# Patient Record
Sex: FEMALE | Race: ASIAN | NOT HISPANIC OR LATINO | Employment: FULL TIME | URBAN - METROPOLITAN AREA
[De-identification: names, ages, dates, MRNs, and addresses within clinical notes are randomized per-mention and may not be internally consistent; named-entity substitution may affect disease eponyms.]

---

## 2017-05-01 ENCOUNTER — TELEPHONE (OUTPATIENT)
Dept: INTERNAL MEDICINE | Facility: CLINIC | Age: 32
End: 2017-05-01

## 2017-05-01 NOTE — TELEPHONE ENCOUNTER
Pt calls, her  noticed some white bubbles at the corner of her mouth when she was taking a nap yesterday. Pt states that when she woke up she had the sensation that something was stuck in the back of her throat and she couldn't swallow. Pt states that the feeling of something stuck did resolve on it's own and has not returned. She is concerned about what might be causing the bubbles. She states her  has noticed this at other times when she sleeps as well - yesterday was the only time she also felt like something was stuck in her throat.     Pt denies difficulty breathing, additional symptoms, recent illness, coughing or taking any new medications. Informed pt that she was likely drooling and saliva can sometimes have a bubbly appearance. This is not uncommon while sleeping, but she should let us know if she is having an excessive amount of drooling or difficulty swallowing. Pt verbalizes understanding.

## 2017-07-03 ENCOUNTER — OFFICE VISIT (OUTPATIENT)
Dept: INTERNAL MEDICINE | Facility: CLINIC | Age: 32
End: 2017-07-03
Payer: COMMERCIAL

## 2017-07-03 VITALS
HEART RATE: 101 BPM | WEIGHT: 161 LBS | SYSTOLIC BLOOD PRESSURE: 102 MMHG | TEMPERATURE: 99.1 F | OXYGEN SATURATION: 100 % | DIASTOLIC BLOOD PRESSURE: 70 MMHG | HEIGHT: 63 IN | BODY MASS INDEX: 28.53 KG/M2

## 2017-07-03 DIAGNOSIS — R21 RASH OF NECK: Primary | ICD-10-CM

## 2017-07-03 DIAGNOSIS — R22.1 LOCALIZED SWELLING, MASS AND LUMP, NECK: ICD-10-CM

## 2017-07-03 LAB
ALBUMIN SERPL-MCNC: 3.5 G/DL (ref 3.4–5)
ALP SERPL-CCNC: 79 U/L (ref 40–150)
ALT SERPL W P-5'-P-CCNC: 18 U/L (ref 0–50)
ANION GAP SERPL CALCULATED.3IONS-SCNC: 7 MMOL/L (ref 3–14)
AST SERPL W P-5'-P-CCNC: 13 U/L (ref 0–45)
BASOPHILS # BLD AUTO: 0 10E9/L (ref 0–0.2)
BASOPHILS NFR BLD AUTO: 0.1 %
BILIRUB SERPL-MCNC: 0.4 MG/DL (ref 0.2–1.3)
BUN SERPL-MCNC: 9 MG/DL (ref 7–30)
CALCIUM SERPL-MCNC: 9.2 MG/DL (ref 8.5–10.1)
CHLORIDE SERPL-SCNC: 106 MMOL/L (ref 94–109)
CO2 SERPL-SCNC: 26 MMOL/L (ref 20–32)
CREAT SERPL-MCNC: 0.55 MG/DL (ref 0.52–1.04)
DIFFERENTIAL METHOD BLD: ABNORMAL
EOSINOPHIL # BLD AUTO: 0.1 10E9/L (ref 0–0.7)
EOSINOPHIL NFR BLD AUTO: 1.6 %
ERYTHROCYTE [DISTWIDTH] IN BLOOD BY AUTOMATED COUNT: 14.7 % (ref 10–15)
GFR SERPL CREATININE-BSD FRML MDRD: NORMAL ML/MIN/1.7M2
GLUCOSE SERPL-MCNC: 96 MG/DL (ref 70–99)
HCT VFR BLD AUTO: 36.7 % (ref 35–47)
HGB BLD-MCNC: 11.6 G/DL (ref 11.7–15.7)
LYMPHOCYTES # BLD AUTO: 1.2 10E9/L (ref 0.8–5.3)
LYMPHOCYTES NFR BLD AUTO: 14.3 %
MCH RBC QN AUTO: 26.6 PG (ref 26.5–33)
MCHC RBC AUTO-ENTMCNC: 31.6 G/DL (ref 31.5–36.5)
MCV RBC AUTO: 84 FL (ref 78–100)
MONOCYTES # BLD AUTO: 0.5 10E9/L (ref 0–1.3)
MONOCYTES NFR BLD AUTO: 6.2 %
NEUTROPHILS # BLD AUTO: 6.5 10E9/L (ref 1.6–8.3)
NEUTROPHILS NFR BLD AUTO: 77.8 %
PLATELET # BLD AUTO: 304 10E9/L (ref 150–450)
POTASSIUM SERPL-SCNC: 3.5 MMOL/L (ref 3.4–5.3)
PROT SERPL-MCNC: 7.8 G/DL (ref 6.8–8.8)
RBC # BLD AUTO: 4.36 10E12/L (ref 3.8–5.2)
SODIUM SERPL-SCNC: 139 MMOL/L (ref 133–144)
TSH SERPL DL<=0.005 MIU/L-ACNC: 1.37 MU/L (ref 0.4–4)
WBC # BLD AUTO: 8.3 10E9/L (ref 4–11)

## 2017-07-03 PROCEDURE — 99213 OFFICE O/P EST LOW 20 MIN: CPT | Performed by: INTERNAL MEDICINE

## 2017-07-03 PROCEDURE — 80050 GENERAL HEALTH PANEL: CPT | Performed by: INTERNAL MEDICINE

## 2017-07-03 PROCEDURE — 36415 COLL VENOUS BLD VENIPUNCTURE: CPT | Performed by: INTERNAL MEDICINE

## 2017-07-03 RX ORDER — VALACYCLOVIR HYDROCHLORIDE 1 G/1
1000 TABLET, FILM COATED ORAL 3 TIMES DAILY
Qty: 21 TABLET | Refills: 0 | Status: ON HOLD | OUTPATIENT
Start: 2017-07-03 | End: 2017-09-26

## 2017-07-03 RX ORDER — BENZOCAINE/MENTHOL 6 MG-10 MG
LOZENGE MUCOUS MEMBRANE
Qty: 30 G | Refills: 0 | Status: SHIPPED | OUTPATIENT
Start: 2017-07-03 | End: 2017-07-07

## 2017-07-03 NOTE — MR AVS SNAPSHOT
After Visit Summary   7/3/2017    Liliam Galvan    MRN: [id number]           Patient Information     Date Of Birth          1985        Visit Information        Provider Department      7/3/2017 8:20 AM Latricia Vasquez MD [facility]        Today's Diagnoses     Rash of neck    -  1    Localized swelling, mass and lump, neck           Follow-ups after your visit        Additional Services     DERMATOLOGY REFERRAL       Your provider has referred you to: [facility]: [facility] [phone]   [email or web address]  [place] [facility] [phone]   [email or web address]  [place] [phone]   [email or web address]    Please be aware that coverage of these services is subject to the terms and limitations of your health insurance plan.  Call member services at your health plan with any benefit or coverage questions.      Please bring the following with you to your appointment:    (1) Any X-Rays, CTs or MRIs which have been performed.  Contact the facility where they were done to arrange for  prior to your scheduled appointment.    (2) List of current medications  (3) This referral request   (4) Any documents/labs given to you for this referral                  Future tests that were ordered for you today     Open Future Orders        Priority Expected Expires Ordered    US Head Neck Soft Tissue Routine  7/3/2018 7/3/2017            Who to contact     If you have questions or need follow up information about today's clinic visit or your schedule please contact [facility] directly at [phone].  Normal or non-critical lab and imaging results will be communicated to you by MyChart, letter or phone within 4 business days after the clinic has received the results. If you do not hear from us within 7 days, please contact the clinic through MyChart or phone. If you have a  "critical or abnormal lab result, we will notify you by phone as soon as possible.  Submit refill requests through [employer] or call your pharmacy and they will forward the refill request to us. Please allow 3 business days for your refill to be completed.          Additional Information About Your Visit        Avtal24hart Information     [employer] gives you secure access to your electronic health record. If you see a primary care provider, you can also send messages to your care team and make appointments. If you have questions, please call your primary care clinic.  If you do not have a primary care provider, please call [phone] and they will assist you.        Care EveryWhere ID     This is your Care EveryWhere ID. This could be used by other organizations to access your [facility] medical records  [id number]        Your Vitals Were     Pulse Temperature Height Pulse Oximetry BMI (Body Mass Index)       101 99.1  F (37.3  C) (Oral) 5' 2.5\" (1.588 m) 100% 28.98 kg/m2        Blood Pressure from Last 3 Encounters:   07/03/17 102/70   12/21/16 108/72   01/25/16 102/64    Weight from Last 3 Encounters:   07/03/17 161 lb (73 kg)   12/21/16 168 lb 3.2 oz (76.3 kg)   01/25/16 158 lb 4.8 oz (71.8 kg)              We Performed the Following     DERMATOLOGY REFERRAL     TSH with free T4 reflex          Today's Medication Changes          These changes are accurate as of: 7/3/17  8:53 AM.  If you have any questions, ask your nurse or doctor.               Start taking these medicines.        Dose/Directions    hydrocortisone 1 % cream   Commonly known as:  CORTAID   Used for:  Rash of neck   Started by:  Latricia Vasquez MD        Apply sparingly to affected area three times daily for 14 days.   Quantity:  30 g   Refills:  0         Stop taking these medicines if you haven't already. Please contact your care team if you have questions.     ciprofloxacin 500 MG tablet   Commonly known as:  CIPRO   Stopped by:  Latricia Vasquez " MD Ray           metoclopramide 5 MG tablet   Commonly known as:  REGLAN   Stopped by:  Latricia Vasquez MD           PREPLUS 27-1 MG Tabs   Stopped by:  Latricia Vasqeuz MD                Where to get your medicines      These medications were sent to [id number] - [facility] [address] AT [place] [address]  [address] [facility] [address]    Hours:  24-hours Phone:  [phone]     hydrocortisone 1 % cream                Primary Care Provider Office Phone # Fax #    Rashard Lo [phone]       [facility] [address] [facility]  [address]  [facility] [address]        Equal Access to Services     PALOMO RIVERO : Hadii steve ku hadasho Soomaali, waaxda luqadaha, qaybta kaalmada adeegyada, waxay idiin hayjoana london. So [facility] [phone].    ATENCIÓN: Si habla español, tiene a arizmendi disposición servicios gratuitos de asistencia lingüística. [facility] [phone].    We comply with applicable federal civil rights laws and [place] laws. We do not discriminate on the basis of race, color, national origin, age, disability sex, sexual orientation or gender identity.            Thank you!     Thank you for choosing [facility]  for your care. Our goal is always to provide you with excellent care. Hearing back from our patients is one way we can continue to improve our services. Please take a few minutes to complete the written survey that you may receive in the mail after your visit with us. Thank you!             Your Updated Medication List - Protect others around you: Learn how to safely use, store and throw away your medicines at www.disposemymeds.org.          This list is accurate as of: 7/3/17  8:53 AM.  Always use your most recent med list.                   Brand Name Dispense Instructions for use Diagnosis    breast pump Misc     1 each    1 each as needed    Ineffective breast feeding        hydrocortisone 1 % cream    CORTAID    30 g    Apply sparingly to affected area three times daily for 14 days.    Rash of neck

## 2017-07-03 NOTE — NURSING NOTE
"Chief Complaint   Patient presents with     Derm Problem     around neck      X 2 months , started by wearing a wool scarf for work . Very itchy painful , tried benadryl cream ,no releif   Initial /70  Pulse 101  Temp 99.1  F (37.3  C) (Oral)  Ht 5' 2.5\" (1.588 m)  Wt 161 lb (73 kg)  SpO2 100%  BMI 28.98 kg/m2 Estimated body mass index is 28.98 kg/(m^2) as calculated from the following:    Height as of this encounter: 5' 2.5\" (1.588 m).    Weight as of this encounter: 161 lb (73 kg).  Medication Reconciliation: complete    "

## 2017-07-03 NOTE — PROGRESS NOTES
"  SUBJECTIVE:                                                    Liliam Galvan is a 32 year old female who presents to clinic today for the following health issues:      Rash.  She had a wool scarf a couple of months ago for work.  She then started to have a rash on her chest and neck.    The patient scratches at night.  The rash does burn slightly.    The neck is the only area of a rash currently.   She feels as if the rest of her body itches.  No one around her with a rash.  No new detergents.   No other concerns today.     Problem list and histories reviewed & adjusted, as indicated.      ROS:  C: NEGATIVE for fever, chills, change in weight  R: NEGATIVE for significant cough or SOB  CV: NEGATIVE for chest pain, palpitations or peripheral edema  Skin: POS rash    OBJECTIVE:     /70  Pulse 101  Temp 99.1  F (37.3  C) (Oral)  Ht 5' 2.5\" (1.588 m)  Wt 161 lb (73 kg)  SpO2 100%  BMI 28.98 kg/m2  Body mass index is 28.98 kg/(m^2).  GENERAL: healthy, alert and no distress  NECK: rash appreciated on neck linear; right side of neck with ?1cm lump appreciated  RESP: lungs clear to auscultation - no rales, rhonchi or wheezes  CV: regular rate and rhythm, normal S1 S2, no S3 or S4, no murmur, click or rub, no peripheral edema and peripheral pulses strong        ASSESSMENT/PLAN:     (R21) Rash of neck  (primary encounter diagnosis)  Comment: possible contact dermatitis versus shingles on dermatome of neck  Plan: hydrocortisone (CORTAID) 1 % cream, DERMATOLOGY        REFERRAL, Comprehensive metabolic panel, CBC         with platelets differential, valACYclovir         (VALTREX) 1000 mg tablet            (R22.1) Localized swelling, mass and lump, neck  Comment:   Plan: TSH with free T4 reflex, US Head Neck Soft         Tissue                Latricia Vasquez MD  [facility]      "

## 2017-07-05 ENCOUNTER — TELEPHONE (OUTPATIENT)
Dept: INTERNAL MEDICINE | Facility: CLINIC | Age: 32
End: 2017-07-05

## 2017-07-05 NOTE — TELEPHONE ENCOUNTER
Pt calls for results of lab done on Monday. Informed pt results were sent through tomoguides and per result note:   Notes Recorded by Latricia Vasquez MD on 7/4/2017 at 9:16 PM  The blood counts are within acceptable limits.  The thyroid test is normal.  The electrolytes and kidney function (GFR) and liver function tests (AST and ALT) are normal.    Pt also asking about rash, she now sees the rash on her legs. Pt asks what she should do, advised pt to follow up with Dermatology - referral was placed by Dr. Vasquez at her appt.

## 2017-07-06 ENCOUNTER — NURSE TRIAGE (OUTPATIENT)
Dept: NURSING | Facility: CLINIC | Age: 32
End: 2017-07-06

## 2017-07-07 ENCOUNTER — OFFICE VISIT (OUTPATIENT)
Dept: PEDIATRICS | Facility: CLINIC | Age: 32
End: 2017-07-07
Payer: COMMERCIAL

## 2017-07-07 VITALS
TEMPERATURE: 98 F | BODY MASS INDEX: 28.53 KG/M2 | SYSTOLIC BLOOD PRESSURE: 110 MMHG | DIASTOLIC BLOOD PRESSURE: 66 MMHG | WEIGHT: 161 LBS | HEART RATE: 68 BPM | OXYGEN SATURATION: 98 % | HEIGHT: 63 IN

## 2017-07-07 DIAGNOSIS — R21 RASH OF NECK: ICD-10-CM

## 2017-07-07 DIAGNOSIS — R21 RASH AND NONSPECIFIC SKIN ERUPTION: Primary | ICD-10-CM

## 2017-07-07 PROCEDURE — 99213 OFFICE O/P EST LOW 20 MIN: CPT | Performed by: INTERNAL MEDICINE

## 2017-07-07 RX ORDER — BENZOCAINE/MENTHOL 6 MG-10 MG
LOZENGE MUCOUS MEMBRANE
Qty: 30 G | Refills: 0 | Status: ON HOLD | OUTPATIENT
Start: 2017-07-07 | End: 2017-09-26

## 2017-07-07 RX ORDER — PERMETHRIN 50 MG/G
CREAM TOPICAL
Qty: 60 G | Refills: 1 | Status: ON HOLD | OUTPATIENT
Start: 2017-07-07 | End: 2017-09-26

## 2017-07-07 NOTE — MR AVS SNAPSHOT
After Visit Summary   7/7/2017    Liliam Galvan    MRN: [id number]           Patient Information     Date Of Birth          1985        Visit Information        Provider Department      7/7/2017 7:30 AM Kendrick [facility]        Today's Diagnoses     Rash and nonspecific skin eruption    -  1    Rash of neck          Care Instructions    1) Start zyrtec, claritin, or allegra to help with itch    2) Treat with permethrin to cover for scabies- this seems less likely but we should still cover for this, wash all bedding in hot water after use. Put on at night and wash off after 8-14 hours    3) Change bathroom soaps to hypoallergenic and change detergent to Free and Clear type (low allergen)    4) Try to wear cotton as this can be lower allergy    If areas are not getting better, we can have a dermatologist evaluate for causes.    Kendrick Sierra MD          Follow-ups after your visit        Who to contact     If you have questions or need follow up information about today's clinic visit or your schedule please contact [facility] directly at [phone].  Normal or non-critical lab and imaging results will be communicated to you by Performablehart, letter or phone within 4 business days after the clinic has received the results. If you do not hear from us within 7 days, please contact the clinic through [employer] or phone. If you have a critical or abnormal lab result, we will notify you by phone as soon as possible.  Submit refill requests through [employer] or call your pharmacy and they will forward the refill request to us. Please allow 3 business days for your refill to be completed.          Additional Information About Your Visit        PerformableharViolet Information     [employer] gives you secure access to your electronic health record. If you see a primary care provider, you can also send messages to your care team and make appointments. If you have questions, please call your  "primary care clinic.  If you do not have a primary care provider, please call [phone] and they will assist you.        Care EveryWhere ID     This is your Care EveryWhere ID. This could be used by other organizations to access your [facility] medical records  [id number]        Your Vitals Were     Pulse Temperature Height Pulse Oximetry BMI (Body Mass Index)       68 98  F (36.7  C) (Oral) 5' 2.5\" (1.588 m) 98% 28.98 kg/m2        Blood Pressure from Last 3 Encounters:   07/07/17 110/66   07/03/17 102/70   12/21/16 108/72    Weight from Last 3 Encounters:   07/07/17 161 lb (73 kg)   07/03/17 161 lb (73 kg)   12/21/16 168 lb 3.2 oz (76.3 kg)              Today, you had the following     No orders found for display         Today's Medication Changes          These changes are accurate as of: 7/7/17  8:03 AM.  If you have any questions, ask your nurse or doctor.               Start taking these medicines.        Dose/Directions    permethrin 5 % cream   Commonly known as:  ELIMITE   Used for:  Rash and nonspecific skin eruption   Started by:  Kendrick Sierra MD        Apply cream from head to toe (except the face); leave on for 8-14 hours then wash off with water;   Quantity:  60 g   Refills:  1            Where to get your medicines      These medications were sent to [facility] [address] [place] [id number]    Hours:  24-hours Phone:  [phone]     hydrocortisone 1 % cream    permethrin 5 % cream                Primary Care Provider Office Phone # Fax #    Rashard Lo [phone]       [facility] [place] [address] [place] [address]        Equal Access to Services     PALOMO RIVERO AH: Carlota Malagon, waalmaz luqadaha, qaybta kaalmanathaniel taylor, annabelle london. So [facility] [phone].    ATENCIÓN: Si shaan fontenot " disposición servicios gratuitos de asistencia lingüística. [address] [phone].    We comply with applicable federal civil rights laws and [place] laws. We do not discriminate on the basis of race, color, national origin, age, disability sex, sexual orientation or gender identity.            Thank you!     Thank you for choosing [facility]  for your care. Our goal is always to provide you with excellent care. Hearing back from our patients is one way we can continue to improve our services. Please take a few minutes to complete the written survey that you may receive in the mail after your visit with us. Thank you!             Your Updated Medication List - Protect others around you: Learn how to safely use, store and throw away your medicines at www.disposemymeds.org.          This list is accurate as of: 7/7/17  8:03 AM.  Always use your most recent med list.                   Brand Name Dispense Instructions for use Diagnosis    breast pump Misc     1 each    1 each as needed    Ineffective breast feeding       hydrocortisone 1 % cream    CORTAID    30 g    Apply sparingly to affected area three times daily for 14 days.    Rash of neck       permethrin 5 % cream    ELIMITE    60 g    Apply cream from head to toe (except the face); leave on for 8-14 hours then wash off with water;    Rash and nonspecific skin eruption       valACYclovir 1000 mg tablet    VALTREX    21 tablet    Take 1 tablet (1,000 mg) by mouth 3 times daily    Rash of neck

## 2017-07-07 NOTE — NURSING NOTE
"Chief Complaint   Patient presents with     Derm Problem       Initial /66 (BP Location: Right arm, Cuff Size: Adult Regular)  Pulse 68  Temp 98  F (36.7  C) (Oral)  Ht 5' 2.5\" (1.588 m)  Wt 161 lb (73 kg)  SpO2 98%  BMI 28.98 kg/m2 Estimated body mass index is 28.98 kg/(m^2) as calculated from the following:    Height as of this encounter: 5' 2.5\" (1.588 m).    Weight as of this encounter: 161 lb (73 kg).  Medication Reconciliation: complete   Angeli Meyers MA    "

## 2017-07-07 NOTE — PATIENT INSTRUCTIONS
1) Start zyrtec, claritin, or allegra to help with itch    2) Treat with permethrin to cover for scabies- this seems less likely but we should still cover for this, wash all bedding in hot water after use. Put on at night and wash off after 8-14 hours    3) Change bathroom soaps to hypoallergenic and change detergent to Free and Clear type (low allergen)    4) Try to wear cotton as this can be lower allergy    If areas are not getting better, we can have a dermatologist evaluate for causes.    Kendrick Sierra MD

## 2017-07-07 NOTE — PROGRESS NOTES
SUBJECTIVE:                                                    Liliam Galvan is a 32 year old female who presents to clinic today for the following health issues:      Follow up  Patient was in Monday July 3rd for Shingles on her neck  Started valtrex the same day, and by the 4th her rash had gotten worse.   Moved down her chest and began in between her legs.   Arms are itching but no rash is present.     Has noted rash around the neck area, started after she Had to wear new scarf and causes issues with rash on the neck, seems getting better but still itching. Had bumps that were on both sides of neck that were almost blister like- was started on valtrex for this as was concerning by previous provider for shingles. The blisters are now gone, now notes a pruritic area on the anterior neck where scarf was. The scarf was made of wool and gave her a more mild reaction every time she used it before.     Using caress and dove soaps, washing cloths with Tide- no changes. No others in home with similar symptoms. No travel.     Now notes areas on her arms with raised small bumps and also in the bilateral medial thigh area with bumps which are pruritic. No discharge from areas.          Problem list and histories reviewed & adjusted, as indicated.  Additional history: as documented    Patient Active Problem List   Diagnosis     CARDIOVASCULAR SCREENING; LDL GOAL LESS THAN 160     PCOS (polycystic ovarian syndrome)     Past Surgical History:   Procedure Laterality Date     NO HISTORY OF SURGERY         Social History   Substance Use Topics     Smoking status: Never Smoker     Smokeless tobacco: Never Used     Alcohol use No     Family History   Problem Relation Age of Onset     Family History Negative Mother      CEREBROVASCULAR DISEASE Paternal Grandfather      DIABETES No family hx of      Coronary Artery Disease No family hx of      Hypertension No family hx of      Hyperlipidemia No family hx of      Breast Cancer No  "family hx of      Cancer - colorectal No family hx of      Ovarian Cancer No family hx of      Prostate Cancer No family hx of      Depression/Anxiety No family hx of      Anesthesia Reaction No family hx of      Thyroid Disease No family hx of      Asthma No family hx of      OSTEOPOROSIS No family hx of      Chemical Addiction No family hx of      Known Genetic Syndrome No family hx of            Reviewed and updated as needed this visit by clinical staff       Reviewed and updated as needed this visit by Provider         ROS:  Constitutional, HEENT, cardiovascular, pulmonary, GI, , musculoskeletal, neuro, skin, endocrine and psych systems are negative, except as otherwise noted.    OBJECTIVE:     /66 (BP Location: Right arm, Cuff Size: Adult Regular)  Pulse 68  Temp 98  F (36.7  C) (Oral)  Ht 5' 2.5\" (1.588 m)  Wt 161 lb (73 kg)  SpO2 98%  BMI 28.98 kg/m2  Body mass index is 28.98 kg/(m^2).  GENERAL: healthy, alert and no distress  RESP: breathing regular and unlabored  MS: no gross musculoskeletal defects noted, no edema  SKIN: hyperpigmented and excoriated area in the lower anterior neck area along skin fold- no erythema, one small papule on right forearm area, no interdigital area involvement, bilateral thighs with small erythematous papules noted as well  NEURO: Normal strength and tone, mentation intact and speech normal  PSYCH: mentation appears normal, affect normal/bright    Diagnostic Test Results:  none     ASSESSMENT/PLAN:     1. Rash of neck  Appears more consistent with a contact dermatitis with previous exposure, shingles would not be bilateral around the neck area, will continue with hydrocortisone short term over area  - hydrocortisone (CORTAID) 1 % cream; Apply sparingly to affected area three times daily for 14 days.  Dispense: 30 g; Refill: 0    2. Rash and nonspecific skin eruption  Unclear trigger, possible contact irritation as well. Will cover for scabies but this is less " likely given interdigital area involvement. Will switch to hypoallergenic soaps and detergents, use antihistamine as well, consider dermatology evaluation if not improving.  - permethrin (ELIMITE) 5 % cream; Apply cream from head to toe (except the face); leave on for 8-14 hours then wash off with water;  Dispense: 60 g; Refill: 1      Patient Instructions   1) Start zyrtec, claritin, or allegra to help with itch    2) Treat with permethrin to cover for scabies- this seems less likely but we should still cover for this, wash all bedding in hot water after use. Put on at night and wash off after 8-14 hours    3) Change bathroom soaps to hypoallergenic and change detergent to Free and Clear type (low allergen)    4) Try to wear cotton as this can be lower allergy    If areas are not getting better, we can have a dermatologist evaluate for causes.    MD Kendrick Rodríguez MD, MD  [facility]

## 2017-07-07 NOTE — TELEPHONE ENCOUNTER
Saw PCP on 7/3 for shingles on neck. Started Valtrex on 7/3. Pt called clinic 7/5 and c/o rash on legs.PCP referred to Derm. Tonight pt c/o red, raised rash, and itching all over legs, arms and chest. It is unclear when this started. Pt first said it started after she began Valtrex then later said itching was present at appt on 7/3 before Valtrex was started. Says she does not understand why she has to go to Derm for rash. Wants to see PCP for rash. Dianne Larkin RN/FNA    Reason for Disposition    Hives or itching    Additional Information    Negative: [1] Life-threatening reaction (anaphylaxis) in the past to the same drug AND [2] < 2 hours since exposure    Negative: Difficulty breathing or wheezing    Negative: [1] Hoarseness or cough AND [2] started soon after 1st dose of drug    Negative: [1] Swollen tongue AND [2] started soon after 1st dose of drug    Negative: [1] Purple or blood-colored rash (spots or dots) AND [2] fever    Negative: Sounds like a life-threatening emergency to the triager    Negative: Rash is only on 1 part of the body (localized)    Negative: Taking new non-prescription (OTC) antihistamine, decongestant, ear drops, eye drops, or other OTC cough/cold medicine    Negative: Taking new prescription antihistamine, allergy medicine, asthma medicine, eye drops, ear drops or nose drops    Negative: Rash started more than 3 days after stopping new prescription medicine    Negative: Swollen tongue    Negative: [1] Widespread hives AND [2] onset < 2 hours of exposure to 1st dose of drug    Negative: Fever    Negative: Patient sounds very sick or weak to the triager    Negative: [1] Purple or blood-colored rash (spots or dots) AND [2] no fever AND [3] sounds well to triager    Negative: [1] Taking new prescription medication AND [2] rash within 4 hours of 1st dose    Negative: Large or small blisters on skin (i.e., fluid filled bubbles or sacs)    Negative: Bloody crusts on lips or sores in mouth     Negative: Face or lip swelling    Protocols used: RASH - WIDESPREAD ON DRUGS-ADULT-AH

## 2017-07-10 ENCOUNTER — TELEPHONE (OUTPATIENT)
Dept: INTERNAL MEDICINE | Facility: CLINIC | Age: 32
End: 2017-07-10

## 2017-07-10 NOTE — TELEPHONE ENCOUNTER
Pt called. Stated she saw Pedro on 7/3 for a rash. At that point rash was just on neck, but as of 7/5 rash spread to legs, arms and chest and was seen again on 7/7. Pt was given a couple more rx's for cream, but they are not helping much. Pt stated this is starting to get serious and want to know what Pedro recommends. Should she see a derm?        Per 7/6 triage note-Saw PCP on 7/3 for shingles on neck. Started Valtrex on 7/3. Pt called clinic 7/5 and c/o rash on legs.PCP referred to Derm. Tonight pt c/o red, raised rash, and itching all over legs, arms and chest. It is unclear when this started. Pt first said it started after she began Valtrex then later said itching was present at appt on 7/3 before Valtrex was started. Says she does not understand why she has to go to Derm for rash. Wants to see PCP for rash. Dianne Larkin RN/FNA

## 2017-07-20 ENCOUNTER — TELEPHONE (OUTPATIENT)
Dept: INTERNAL MEDICINE | Facility: CLINIC | Age: 32
End: 2017-07-20

## 2017-07-20 ENCOUNTER — HOSPITAL ENCOUNTER (OUTPATIENT)
Dept: ULTRASOUND IMAGING | Facility: CLINIC | Age: 32
Discharge: HOME | End: 2017-07-20
Attending: INTERNAL MEDICINE | Admitting: INTERNAL MEDICINE
Payer: COMMERCIAL

## 2017-07-20 DIAGNOSIS — R22.1 LOCALIZED SWELLING, MASS AND LUMP, NECK: ICD-10-CM

## 2017-07-20 DIAGNOSIS — R59.1 LYMPHADENOPATHY: Primary | ICD-10-CM

## 2017-07-20 LAB — RADIOLOGIST FLAGS: ABNORMAL

## 2017-07-20 PROCEDURE — 76536 US EXAM OF HEAD AND NECK: CPT

## 2017-07-20 NOTE — TELEPHONE ENCOUNTER
Pt called. Stated she wants US results. Having a lot of pain in her neck      Per US impression-IMPRESSION: Multiple enlarged and abnormal appearing lymph nodes in  the right and left neck as described. An infectious, inflammatory or  malignant etiology is possible. Follow-up CT neck with contrast  recommended for further assessment.     [Access Center: Multiple bilateral abnormal appearing cervical lymph  nodes]

## 2017-07-20 NOTE — TELEPHONE ENCOUNTER
Spoke with patient. Discussed US results showing lymph nodes, advised her that radiology recommended neck CT scan.   We agreed to have me place the order, someone will be contacting her to schedule.   Advised patient that I would forward this information to Dr Vasquez.

## 2017-09-25 ENCOUNTER — OFFICE VISIT (OUTPATIENT)
Dept: INTERNAL MEDICINE | Facility: CLINIC | Age: 32
End: 2017-09-25
Payer: COMMERCIAL

## 2017-09-25 ENCOUNTER — TELEPHONE (OUTPATIENT)
Dept: INTERNAL MEDICINE | Facility: CLINIC | Age: 32
End: 2017-09-25

## 2017-09-25 ENCOUNTER — NURSE TRIAGE (OUTPATIENT)
Dept: NURSING | Facility: CLINIC | Age: 32
End: 2017-09-25

## 2017-09-25 ENCOUNTER — HOSPITAL ENCOUNTER (OUTPATIENT)
Dept: CT IMAGING | Facility: CLINIC | Age: 32
Discharge: HOME | End: 2017-09-25
Attending: FAMILY MEDICINE | Admitting: FAMILY MEDICINE
Payer: COMMERCIAL

## 2017-09-25 VITALS
HEART RATE: 126 BPM | TEMPERATURE: 99.4 F | BODY MASS INDEX: 27.82 KG/M2 | WEIGHT: 157 LBS | HEIGHT: 63 IN | SYSTOLIC BLOOD PRESSURE: 110 MMHG | DIASTOLIC BLOOD PRESSURE: 70 MMHG | OXYGEN SATURATION: 96 %

## 2017-09-25 DIAGNOSIS — R59.1 LYMPHADENOPATHY: ICD-10-CM

## 2017-09-25 DIAGNOSIS — R22.1 NECK MASS: ICD-10-CM

## 2017-09-25 DIAGNOSIS — R22.1 NECK MASS: Primary | ICD-10-CM

## 2017-09-25 LAB
ALBUMIN SERPL-MCNC: 3.4 G/DL (ref 3.4–5)
ALP SERPL-CCNC: 89 U/L (ref 40–150)
ALT SERPL W P-5'-P-CCNC: 17 U/L (ref 0–50)
ANION GAP SERPL CALCULATED.3IONS-SCNC: 8 MMOL/L (ref 3–14)
ANISOCYTOSIS BLD QL SMEAR: SLIGHT
AST SERPL W P-5'-P-CCNC: 16 U/L (ref 0–45)
BILIRUB SERPL-MCNC: 0.4 MG/DL (ref 0.2–1.3)
BUN SERPL-MCNC: 5 MG/DL (ref 7–30)
CALCIUM SERPL-MCNC: 9 MG/DL (ref 8.5–10.1)
CHLORIDE SERPL-SCNC: 104 MMOL/L (ref 94–109)
CO2 SERPL-SCNC: 27 MMOL/L (ref 20–32)
CREAT SERPL-MCNC: 0.52 MG/DL (ref 0.52–1.04)
DIFFERENTIAL METHOD BLD: ABNORMAL
ERYTHROCYTE [DISTWIDTH] IN BLOOD BY AUTOMATED COUNT: 14.9 % (ref 10–15)
GFR SERPL CREATININE-BSD FRML MDRD: >90 ML/MIN/1.7M2
GLUCOSE SERPL-MCNC: 93 MG/DL (ref 70–99)
HCT VFR BLD AUTO: 35.3 % (ref 35–47)
HGB BLD-MCNC: 11.1 G/DL (ref 11.7–15.7)
LYMPHOCYTES # BLD AUTO: 1 10E9/L (ref 0.8–5.3)
LYMPHOCYTES NFR BLD AUTO: 11 %
MCH RBC QN AUTO: 26.1 PG (ref 26.5–33)
MCHC RBC AUTO-ENTMCNC: 31.4 G/DL (ref 31.5–36.5)
MCV RBC AUTO: 83 FL (ref 78–100)
MONOCYTES # BLD AUTO: 0.7 10E9/L (ref 0–1.3)
MONOCYTES NFR BLD AUTO: 7 %
NEUTROPHILS # BLD AUTO: 7.8 10E9/L (ref 1.6–8.3)
NEUTROPHILS NFR BLD AUTO: 82 %
PLATELET # BLD AUTO: 347 10E9/L (ref 150–450)
PLATELET # BLD EST: NORMAL 10*3/UL
POTASSIUM SERPL-SCNC: 4 MMOL/L (ref 3.4–5.3)
PROT SERPL-MCNC: 7.9 G/DL (ref 6.8–8.8)
RBC # BLD AUTO: 4.26 10E12/L (ref 3.8–5.2)
SODIUM SERPL-SCNC: 139 MMOL/L (ref 133–144)
WBC # BLD AUTO: 9.5 10E9/L (ref 4–11)

## 2017-09-25 PROCEDURE — 70491 CT SOFT TISSUE NECK W/DYE: CPT

## 2017-09-25 PROCEDURE — 80053 COMPREHEN METABOLIC PANEL: CPT | Performed by: FAMILY MEDICINE

## 2017-09-25 PROCEDURE — 36415 COLL VENOUS BLD VENIPUNCTURE: CPT | Performed by: FAMILY MEDICINE

## 2017-09-25 PROCEDURE — 25000128 H RX IP 250 OP 636: Performed by: FAMILY MEDICINE

## 2017-09-25 PROCEDURE — 85025 COMPLETE CBC W/AUTO DIFF WBC: CPT | Performed by: FAMILY MEDICINE

## 2017-09-25 PROCEDURE — 99214 OFFICE O/P EST MOD 30 MIN: CPT | Performed by: FAMILY MEDICINE

## 2017-09-25 PROCEDURE — 86480 TB TEST CELL IMMUN MEASURE: CPT | Performed by: FAMILY MEDICINE

## 2017-09-25 RX ORDER — IOPAMIDOL 755 MG/ML
500 INJECTION, SOLUTION INTRAVASCULAR ONCE
Status: COMPLETED | OUTPATIENT
Start: 2017-09-25 | End: 2017-09-25

## 2017-09-25 RX ADMIN — SODIUM CHLORIDE 65 ML: 9 INJECTION, SOLUTION INTRAVENOUS at 16:47

## 2017-09-25 RX ADMIN — IOPAMIDOL 80 ML: 755 INJECTION, SOLUTION INTRAVENOUS at 16:47

## 2017-09-25 NOTE — TELEPHONE ENCOUNTER
Pt called again regarding the CT Scan of her neck, states she is having discomfort. Please advise.   Brianne Ramos RN

## 2017-09-25 NOTE — TELEPHONE ENCOUNTER
It shows a lot of enlarged lymph nodes of the neck and chest. This could be due to infection, inflammation or other causes. Dr. Weaver is back tomorrow to review and determine next step.

## 2017-09-25 NOTE — TELEPHONE ENCOUNTER
Clinic Action Needed:Yes, please return call  Reason for Call: Liliam is calling to inquire about her lab work and CT scan results from 9/25.  Advised that one lab is still in process and the remainder of lab work and CT scan results need to be reviewed and released by provider. Liliam is quite anxious and would like a call as soon as results available.  Thank you.     Routed to: RI AURE  Sonny Epic Pool    Reba Still, RN  [facility]

## 2017-09-25 NOTE — MR AVS SNAPSHOT
After Visit Summary   9/25/2017    Liliam Galvan    MRN: [id number]           Patient Information     Date Of Birth          1985        Visit Information        Provider Department      9/25/2017 8:20 AM Amrik Weaver MD [facility]        Today's Diagnoses     Neck mass    -  1       Follow-ups after your visit        Future tests that were ordered for you today     Open Future Orders        Priority Expected Expires Ordered    CT Soft Tissue Neck w Contrast Routine  9/25/2018 9/25/2017            Who to contact     If you have questions or need follow up information about today's clinic visit or your schedule please contact [facility] directly at [phone].  Normal or non-critical lab and imaging results will be communicated to you by MyChart, letter or phone within 4 business days after the clinic has received the results. If you do not hear from us within 7 days, please contact the clinic through [employer] or phone. If you have a critical or abnormal lab result, we will notify you by phone as soon as possible.  Submit refill requests through [employer] or call your pharmacy and they will forward the refill request to us. Please allow 3 business days for your refill to be completed.          Additional Information About Your Visit        MyChart Information     [employer] gives you secure access to your electronic health record. If you see a primary care provider, you can also send messages to your care team and make appointments. If you have questions, please call your primary care clinic.  If you do not have a primary care provider, please call [phone] and they will assist you.        Care EveryWhere ID     This is your Care EveryWhere ID. This could be used by other organizations to access your [place] medical records  [id number]        Your Vitals Were     Pulse Temperature Height Last Period Pulse Oximetry BMI (Body Mass Index)    126 99.4  F  "(37.4  C) (Oral) 5' 3\" (1.6 m) 09/18/2017 96% 27.81 kg/m2       Blood Pressure from Last 3 Encounters:   09/25/17 110/70   07/07/17 110/66   07/03/17 102/70    Weight from Last 3 Encounters:   09/25/17 157 lb (71.2 kg)   07/07/17 161 lb (73 kg)   07/03/17 161 lb (73 kg)              We Performed the Following     CBC with platelets and differential     Comprehensive metabolic panel (BMP + Alb, Alk Phos, ALT, AST, Total. Bili, TP)        Primary Care Provider Office Phone # Fax #    Rashard Lo [phone]       [address] [address]        Equal Access to Services     PALOMO RIVERO : Hadii steve case hadasho Somerle, waaxda luqadaha, qaybta kaalmada adeegyada, annabelle valverde . So [facility] [phone].    ATENCIÓN: Si habla español, tiene a arizmendi disposición servicios gratuitos de asistencia lingüística. Rd al [phone].    We comply with applicable federal civil rights laws and [place] laws. We do not discriminate on the basis of race, color, national origin, age, disability sex, sexual orientation or gender identity.            Thank you!     Thank you for choosing [facility]  for your care. Our goal is always to provide you with excellent care. Hearing back from our patients is one way we can continue to improve our services. Please take a few minutes to complete the written survey that you may receive in the mail after your visit with us. Thank you!             Your Updated Medication List - Protect others around you: Learn how to safely use, store and throw away your medicines at www.disposemymeds.org.          This list is accurate as of: 9/25/17  9:03 AM.  Always use your most recent med list.                   Brand Name Dispense Instructions for use Diagnosis    breast pump Misc     1 each    1 each as needed    Ineffective breast feeding       hydrocortisone 1 % cream    CORTAID    30 g    Apply sparingly to affected " area three times daily for 14 days.    Rash of neck       permethrin 5 % cream    ELIMITE    60 g    Apply cream from head to toe (except the face); leave on for 8-14 hours then wash off with water;    Rash and nonspecific skin eruption       valACYclovir 1000 mg tablet    VALTREX    21 tablet    Take 1 tablet (1,000 mg) by mouth 3 times daily    Rash of neck

## 2017-09-25 NOTE — TELEPHONE ENCOUNTER
Pt calls because she is very worried about what her test results might show.  She was advised that her CBC is the only test that has been resulted.  She asks what the values of her white blood cells are.  She was advised that they are normal, but that her CBC is not remarkable, and doesn't tell us much about her condition.

## 2017-09-25 NOTE — TELEPHONE ENCOUNTER
Patient calling again for results of CT scan.  CT scan resulted. Abnormal findings.   Provider please review and advise. Thank you.

## 2017-09-25 NOTE — TELEPHONE ENCOUNTER
Patient calling again asking for results CT scan and TB, still processing, advised would call when resulted for TB and CT when reviewed by provider.

## 2017-09-25 NOTE — NURSING NOTE
"Chief Complaint   Patient presents with     Throat Problem   continues with throat problems , neck feels stiff ,seen 7/3/17 had U/S of neck   Initial /70  Pulse 126  Temp 99.4  F (37.4  C) (Oral)  Ht 5' 3\" (1.6 m)  Wt 157 lb (71.2 kg)  LMP 09/18/2017  SpO2 96%  BMI 27.81 kg/m2 Estimated body mass index is 27.81 kg/(m^2) as calculated from the following:    Height as of this encounter: 5' 3\" (1.6 m).    Weight as of this encounter: 157 lb (71.2 kg).  Medication Reconciliation: complete    "

## 2017-09-25 NOTE — TELEPHONE ENCOUNTER
"Patient calling again asking for results, again advised pt results for CT scan have not been resulted and reviewed at this time. Advised would call [facility] if critical lab result if not critical would call tomorrow with results. Pt states \" I will call back in 1 hour\".  Patient also states she is \"very nervous about the results, I'm still having stiffness in my neck.\"  "

## 2017-09-25 NOTE — NURSING NOTE
Spoke with Radiology scheduling  They stated that Patient is scheduled today for neck CT at 5:00 .    KLAUDIA Tracy LPN

## 2017-09-25 NOTE — PROGRESS NOTES
"CHIEF COMPLAINT    Neck swelling      HISTORY    This patient has noticed neck swelling over at least 2 months. Initially she noticed a rash, felt to be from a scarf. An ultrasound was done showing lymphadenopathy. A CT was ordered but not completed. She returns now with large swelling in R neck.    Additionally she has felt feverish. Throat occasionally feels tight. Appetite may be down slightly. Wt down 4 #.    Patient Active Problem List   Diagnosis     CARDIOVASCULAR SCREENING; LDL GOAL LESS THAN 160     PCOS (polycystic ovarian syndrome)     Current Outpatient Prescriptions   Medication Sig Dispense Refill     hydrocortisone (CORTAID) 1 % cream Apply sparingly to affected area three times daily for 14 days. 30 g 0     permethrin (ELIMITE) 5 % cream Apply cream from head to toe (except the face); leave on for 8-14 hours then wash off with water; 60 g 1     valACYclovir (VALTREX) 1000 mg tablet Take 1 tablet (1,000 mg) by mouth 3 times daily (Patient not taking: Reported on 7/7/2017) 21 tablet 0     Misc. Devices (BREAST PUMP) MISC 1 each as needed 1 each 0       REVIEW OF SYSTEMS    No HA  No cough or SOB  No CP  No abd pain  No edema      SOCIAL HISTORY    , 2 children 3, 1 1/2.  Works as a .        Past Medical History:   Diagnosis Date     NO ACTIVE PROBLEMS          EXAM  /70  Pulse 126  Temp 99.4  F (37.4  C) (Oral)  Ht 5' 3\" (1.6 m)  Wt 157 lb (71.2 kg)  LMP 09/18/2017  SpO2 96%  BMI 27.81 kg/m2    NAD  Pharynx: WNL  Neck: large mass from R supraclavicular region up into anterior cervical chain, size irregular but about 6 X 6 cm. Suspect lymphadenopathy as opposed to thyroid enlargement  Chest: clear  CV: RSR w/o murmur  Skin: no significant rash  Legs: no edema      (R22.1) Neck mass  (primary encounter diagnosis)  Comment:   Plan: Comprehensive metabolic panel (BMP + Alb, Alk         Phos, ALT, AST, Total. Bili, TP), CBC with         platelets and differential, CT Soft " Tissue Neck        w Contrast

## 2017-09-25 NOTE — TELEPHONE ENCOUNTER
Pt calls, asks if labs from appt with Dr. Weaver are back yet. Informed pt labs are still pending. Pt has appt for CT scan today, asks questions about neck u/s from July. Informed pt that u/s showed abnormal lymph nodes on both sides of her neck and recommended further testing to determine cause.

## 2017-09-26 ENCOUNTER — HOSPITAL ENCOUNTER (INPATIENT)
Facility: CLINIC | Age: 32
LOS: 1 days | Discharge: HOME | DRG: 804 | End: 2017-09-27
Attending: INTERNAL MEDICINE | Admitting: INTERNAL MEDICINE
Payer: COMMERCIAL

## 2017-09-26 ENCOUNTER — APPOINTMENT (OUTPATIENT)
Dept: CT IMAGING | Facility: CLINIC | Age: 32
DRG: 804 | End: 2017-09-26
Attending: PHYSICIAN ASSISTANT
Payer: COMMERCIAL

## 2017-09-26 ENCOUNTER — APPOINTMENT (OUTPATIENT)
Dept: ULTRASOUND IMAGING | Facility: CLINIC | Age: 32
DRG: 804 | End: 2017-09-26
Attending: PHYSICIAN ASSISTANT
Payer: COMMERCIAL

## 2017-09-26 ENCOUNTER — OFFICE VISIT (OUTPATIENT)
Dept: INTERNAL MEDICINE | Facility: CLINIC | Age: 32
End: 2017-09-26
Payer: COMMERCIAL

## 2017-09-26 ENCOUNTER — APPOINTMENT (OUTPATIENT)
Dept: GENERAL RADIOLOGY | Facility: CLINIC | Age: 32
DRG: 804 | End: 2017-09-26
Attending: INTERNAL MEDICINE
Payer: COMMERCIAL

## 2017-09-26 VITALS
DIASTOLIC BLOOD PRESSURE: 70 MMHG | TEMPERATURE: 97.9 F | BODY MASS INDEX: 27.82 KG/M2 | OXYGEN SATURATION: 100 % | SYSTOLIC BLOOD PRESSURE: 110 MMHG | HEART RATE: 129 BPM | HEIGHT: 63 IN | WEIGHT: 157 LBS

## 2017-09-26 DIAGNOSIS — R59.1 LYMPHADENOPATHY: Primary | ICD-10-CM

## 2017-09-26 PROBLEM — R59.0 LAD (LYMPHADENOPATHY), MEDIASTINAL: Status: ACTIVE | Noted: 2017-09-26

## 2017-09-26 LAB — HCG SERPL QL: NEGATIVE

## 2017-09-26 PROCEDURE — 88341 IMHCHEM/IMCYTCHM EA ADD ANTB: CPT | Performed by: RADIOLOGY

## 2017-09-26 PROCEDURE — 25000125 ZZHC RX 250: Performed by: RADIOLOGY

## 2017-09-26 PROCEDURE — 99223 1ST HOSP IP/OBS HIGH 75: CPT | Mod: AI | Performed by: PHYSICIAN ASSISTANT

## 2017-09-26 PROCEDURE — 88305 TISSUE EXAM BY PATHOLOGIST: CPT | Mod: 26 | Performed by: RADIOLOGY

## 2017-09-26 PROCEDURE — 86698 HISTOPLASMA ANTIBODY: CPT | Performed by: INTERNAL MEDICINE

## 2017-09-26 PROCEDURE — 88172 CYTP DX EVAL FNA 1ST EA SITE: CPT | Performed by: RADIOLOGY

## 2017-09-26 PROCEDURE — 87389 HIV-1 AG W/HIV-1&-2 AB AG IA: CPT | Performed by: INTERNAL MEDICINE

## 2017-09-26 PROCEDURE — 12000000 ZZH R&B MED SURG/OB

## 2017-09-26 PROCEDURE — 25000128 H RX IP 250 OP 636: Performed by: INTERNAL MEDICINE

## 2017-09-26 PROCEDURE — 74177 CT ABD & PELVIS W/CONTRAST: CPT

## 2017-09-26 PROCEDURE — 88342 IMHCHEM/IMCYTCHM 1ST ANTB: CPT | Performed by: RADIOLOGY

## 2017-09-26 PROCEDURE — 00000159 ZZHCL STATISTIC H-SEND OUTS PREP: Performed by: RADIOLOGY

## 2017-09-26 PROCEDURE — 71260 CT THORAX DX C+: CPT

## 2017-09-26 PROCEDURE — 36415 COLL VENOUS BLD VENIPUNCTURE: CPT | Performed by: INTERNAL MEDICINE

## 2017-09-26 PROCEDURE — 38505 NEEDLE BIOPSY LYMPH NODES: CPT

## 2017-09-26 PROCEDURE — 87385 HISTOPLASMA CAPSUL AG IA: CPT | Performed by: INTERNAL MEDICINE

## 2017-09-26 PROCEDURE — 86612 BLASTOMYCES ANTIBODY: CPT | Performed by: INTERNAL MEDICINE

## 2017-09-26 PROCEDURE — 71020 XR CHEST 2 VW: CPT

## 2017-09-26 PROCEDURE — 88173 CYTOPATH EVAL FNA REPORT: CPT | Performed by: RADIOLOGY

## 2017-09-26 PROCEDURE — 88172 CYTP DX EVAL FNA 1ST EA SITE: CPT | Mod: 26 | Performed by: RADIOLOGY

## 2017-09-26 PROCEDURE — 99214 OFFICE O/P EST MOD 30 MIN: CPT | Performed by: FAMILY MEDICINE

## 2017-09-26 PROCEDURE — 86635 COCCIDIOIDES ANTIBODY: CPT | Performed by: INTERNAL MEDICINE

## 2017-09-26 PROCEDURE — 88341 IMHCHEM/IMCYTCHM EA ADD ANTB: CPT | Mod: 26 | Performed by: RADIOLOGY

## 2017-09-26 PROCEDURE — 07B13ZX EXCISION OF RIGHT NECK LYMPHATIC, PERCUTANEOUS APPROACH, DIAGNOSTIC: ICD-10-PCS | Performed by: RADIOLOGY

## 2017-09-26 PROCEDURE — 25000125 ZZHC RX 250: Performed by: INTERNAL MEDICINE

## 2017-09-26 PROCEDURE — 84703 CHORIONIC GONADOTROPIN ASSAY: CPT | Performed by: INTERNAL MEDICINE

## 2017-09-26 PROCEDURE — 88342 IMHCHEM/IMCYTCHM 1ST ANTB: CPT | Mod: 26 | Performed by: RADIOLOGY

## 2017-09-26 PROCEDURE — 88305 TISSUE EXAM BY PATHOLOGIST: CPT | Performed by: RADIOLOGY

## 2017-09-26 PROCEDURE — 76942 ECHO GUIDE FOR BIOPSY: CPT

## 2017-09-26 PROCEDURE — 88173 CYTOPATH EVAL FNA REPORT: CPT | Mod: 26 | Performed by: RADIOLOGY

## 2017-09-26 PROCEDURE — 86606 ASPERGILLUS ANTIBODY: CPT | Performed by: INTERNAL MEDICINE

## 2017-09-26 RX ORDER — HYDROMORPHONE HYDROCHLORIDE 1 MG/ML
.3-.5 INJECTION, SOLUTION INTRAMUSCULAR; INTRAVENOUS; SUBCUTANEOUS
Status: DISCONTINUED | OUTPATIENT
Start: 2017-09-26 | End: 2017-09-27 | Stop reason: HOSPADM

## 2017-09-26 RX ORDER — ONDANSETRON 4 MG/1
4 TABLET, ORALLY DISINTEGRATING ORAL EVERY 6 HOURS PRN
Status: DISCONTINUED | OUTPATIENT
Start: 2017-09-26 | End: 2017-09-27 | Stop reason: HOSPADM

## 2017-09-26 RX ORDER — IOPAMIDOL 755 MG/ML
77 INJECTION, SOLUTION INTRAVASCULAR ONCE
Status: COMPLETED | OUTPATIENT
Start: 2017-09-26 | End: 2017-09-26

## 2017-09-26 RX ORDER — OXYCODONE HYDROCHLORIDE 5 MG/1
5-10 TABLET ORAL
Status: DISCONTINUED | OUTPATIENT
Start: 2017-09-26 | End: 2017-09-27 | Stop reason: HOSPADM

## 2017-09-26 RX ORDER — CALCIUM CARBONATE 500 MG/1
500-1000 TABLET, CHEWABLE ORAL 4 TIMES DAILY PRN
Status: DISCONTINUED | OUTPATIENT
Start: 2017-09-26 | End: 2017-09-27 | Stop reason: HOSPADM

## 2017-09-26 RX ORDER — AMOXICILLIN 250 MG
1-2 CAPSULE ORAL 2 TIMES DAILY PRN
Status: DISCONTINUED | OUTPATIENT
Start: 2017-09-26 | End: 2017-09-27 | Stop reason: HOSPADM

## 2017-09-26 RX ORDER — LORAZEPAM 0.5 MG/1
.5-1 TABLET ORAL EVERY 4 HOURS PRN
Status: DISCONTINUED | OUTPATIENT
Start: 2017-09-26 | End: 2017-09-27 | Stop reason: HOSPADM

## 2017-09-26 RX ORDER — LIDOCAINE HYDROCHLORIDE 10 MG/ML
5 INJECTION, SOLUTION EPIDURAL; INFILTRATION; INTRACAUDAL; PERINEURAL ONCE
Status: COMPLETED | OUTPATIENT
Start: 2017-09-26 | End: 2017-09-26

## 2017-09-26 RX ORDER — ACETAMINOPHEN 325 MG/1
650 TABLET ORAL EVERY 4 HOURS PRN
Status: DISCONTINUED | OUTPATIENT
Start: 2017-09-26 | End: 2017-09-27 | Stop reason: HOSPADM

## 2017-09-26 RX ORDER — LIDOCAINE 40 MG/G
CREAM TOPICAL
Status: DISCONTINUED | OUTPATIENT
Start: 2017-09-26 | End: 2017-09-27 | Stop reason: HOSPADM

## 2017-09-26 RX ORDER — PROCHLORPERAZINE MALEATE 5 MG
5-10 TABLET ORAL EVERY 6 HOURS PRN
Status: DISCONTINUED | OUTPATIENT
Start: 2017-09-26 | End: 2017-09-27 | Stop reason: HOSPADM

## 2017-09-26 RX ORDER — NALOXONE HYDROCHLORIDE 0.4 MG/ML
.1-.4 INJECTION, SOLUTION INTRAMUSCULAR; INTRAVENOUS; SUBCUTANEOUS
Status: DISCONTINUED | OUTPATIENT
Start: 2017-09-26 | End: 2017-09-27 | Stop reason: HOSPADM

## 2017-09-26 RX ORDER — ONDANSETRON 2 MG/ML
4 INJECTION INTRAMUSCULAR; INTRAVENOUS EVERY 6 HOURS PRN
Status: DISCONTINUED | OUTPATIENT
Start: 2017-09-26 | End: 2017-09-27 | Stop reason: HOSPADM

## 2017-09-26 RX ORDER — PROCHLORPERAZINE 25 MG
25 SUPPOSITORY, RECTAL RECTAL EVERY 12 HOURS PRN
Status: DISCONTINUED | OUTPATIENT
Start: 2017-09-26 | End: 2017-09-27 | Stop reason: HOSPADM

## 2017-09-26 RX ADMIN — SODIUM CHLORIDE 62 ML: 9 INJECTION, SOLUTION INTRAVENOUS at 14:15

## 2017-09-26 RX ADMIN — LIDOCAINE HYDROCHLORIDE 50 MG: 10 INJECTION, SOLUTION EPIDURAL; INFILTRATION; INTRACAUDAL; PERINEURAL at 14:55

## 2017-09-26 RX ADMIN — IOPAMIDOL 77 ML: 755 INJECTION, SOLUTION INTRAVENOUS at 14:14

## 2017-09-26 ASSESSMENT — ACTIVITIES OF DAILY LIVING (ADL)
DRESS: 0-->INDEPENDENT
FALL_HISTORY_WITHIN_LAST_SIX_MONTHS: NO
COGNITION: 0 - NO COGNITION ISSUES REPORTED
RETIRED_COMMUNICATION: 0-->UNDERSTANDS/COMMUNICATES WITHOUT DIFFICULTY
AMBULATION: 0-->INDEPENDENT
TOILETING: 0-->INDEPENDENT
TRANSFERRING: 0-->INDEPENDENT
SWALLOWING: 0-->SWALLOWS FOODS/LIQUIDS WITHOUT DIFFICULTY
BATHING: 0-->INDEPENDENT
RETIRED_EATING: 0-->INDEPENDENT

## 2017-09-26 NOTE — CONSULTS
"[facility]    Infectious Disease Consultation     Date of Admission:  9/26/2017  Date of Consult (When I saw the patient): 09/26/17    Assessment & Plan   Liliam Galvan is a 32 year old female who was admitted on 9/26/2017.     Impression:  32 y.o female admitted with months of on and off swelling mostly in the right cervical lymph node chain.   Also some fevers, voice hoarse ness and cough.   Patient from [facility], unknown PPD. No known contacts with Tuberculosis and last travel to [facility] was in 2012.   Qunatiferon pending.   Neck CT positive for \" Massive lymphadenopathy of the mediastinum and right neck up to the  level of the hyoid bone as well as mild lymphadenopathy in the  supraclavicular left neck\"     Recommendations:   CT chest to rule out active pulmonary disease.   Sputum for AFB if CT chest positive and is able to given any sputum.  ENT consult for possible FNA of the right neck LN.   CT surgery consult given amount of mediastinal LN involvement.   Fungal serologies ordered for completeness, last HIV testing in 2015 will repeat.   Hold off starting any antimicrobials for now.     Jv Thornton MD    Reason for Consult   Reason for consult: I was asked by Zay ARROYO to evaluate this patient for cervical and mediastinal lymphadenopathy..    Primary Care Physician   Rashard Lo    Chief Complaint   Progressive cervical lymph node swelling in a recent immigrant from [facility].     History is obtained from the patient and medical records    History of Present Illness   Liliam Galvan is a 32 year old female who has noticed neck swelling over at least 2 months. Initially she noticed a rash, felt to be from a scarf. An ultrasound was done showing lymphadenopathy. A CT was ordered but not completed. She returns now with large swelling in R neck.  Also fevers and weight loss but was trying to eat healthy. Some minimal cough, voice hoarseness symptoms. Does not know her TB history. "     Past Medical History   I have reviewed this patient's medical history and updated it with pertinent information if needed.   Past Medical History:   Diagnosis Date     NO ACTIVE PROBLEMS        Past Surgical History   I have reviewed this patient's surgical history and updated it with pertinent information if needed.  Past Surgical History:   Procedure Laterality Date     NO HISTORY OF SURGERY         Prior to Admission Medications   Prior to Admission Medications   Prescriptions Last Dose Informant Patient Reported? Taking?   Misc. Devices (BREAST PUMP) MISC   No No   Si each as needed   hydrocortisone (CORTAID) 1 % cream   No No   Sig: Apply sparingly to affected area three times daily for 14 days.   permethrin (ELIMITE) 5 % cream   No No   Sig: Apply cream from head to toe (except the face); leave on for 8-14 hours then wash off with water;   valACYclovir (VALTREX) 1000 mg tablet   No No   Sig: Take 1 tablet (1,000 mg) by mouth 3 times daily      Facility-Administered Medications: None     Allergies   Allergies   Allergen Reactions     Nkda [No Known Drug Allergies]        Immunization History   Immunization History   Administered Date(s) Administered     Influenza (IIV3) 10/28/2013, 2016     Influenza Vaccine IM 3yrs+ 4 Valent IIV4 2015     TDAP Vaccine (Adacel) 2014     TDAP Vaccine (Boostrix) 2015       Social History   I have reviewed this patient's social history and updated it with pertinent information if needed. Liliam Galvan  reports that she has never smoked. She has never used smokeless tobacco. She reports that she does not drink alcohol or use illicit drugs.    Family History   I have reviewed this patient's family history and updated it with pertinent information if needed.   Family History   Problem Relation Age of Onset     Family History Negative Mother      CEREBROVASCULAR DISEASE Paternal Grandfather      DIABETES No family hx of      Coronary Artery Disease No  family hx of      Hypertension No family hx of      Hyperlipidemia No family hx of      Breast Cancer No family hx of      Cancer - colorectal No family hx of      Ovarian Cancer No family hx of      Prostate Cancer No family hx of      Depression/Anxiety No family hx of      Anesthesia Reaction No family hx of      Thyroid Disease No family hx of      Asthma No family hx of      OSTEOPOROSIS No family hx of      Chemical Addiction No family hx of      Known Genetic Syndrome No family hx of        Review of Systems   The 10 point Review of Systems is negative other than noted in the HPI or here.     Physical Exam   Temp: 99.1  F (37.3  C) Temp src: Oral BP: 123/83 Pulse: 115   Resp: 18 SpO2: 100 % O2 Device: None (Room air)    Vital Signs with Ranges  Temp:  [97.9  F (36.6  C)-99.1  F (37.3  C)] 99.1  F (37.3  C)  Pulse:  [115-129] 115  Resp:  [18] 18  BP: (110-123)/(70-83) 123/83  SpO2:  [100 %] 100 %  157 lbs 0 oz    GENERAL APPEARANCE:  alert and no distress  EYES: Eyes grossly normal to inspection, PERRL and conjunctivae and sclerae normal  HENT: right neck swollen   NECK: no adenopathy, no asymmetry, masses, or scars and thyroid normal to palpation  RESP: lungs clear to auscultation - no rales, rhonchi or wheezes  CV: regular rates and rhythm, normal S1 S2, no S3 or S4 and no murmur, click or rub  LYMPHATICS: normal ant/post cervical and supraclavicular nodes  ABDOMEN: soft, nontender, without hepatosplenomegaly or masses and bowel sounds normal  MS: extremities normal- no gross deformities noted  SKIN: no suspicious lesions or rashes      Data   Lab Results   Component Value Date    WBC 9.5 09/25/2017    HGB 11.1 (L) 09/25/2017    HCT 35.3 09/25/2017     09/25/2017     09/25/2017    POTASSIUM 4.0 09/25/2017    CHLORIDE 104 09/25/2017    CO2 27 09/25/2017    BUN 5 (L) 09/25/2017    CR 0.52 09/25/2017    GLC 93 09/25/2017    SED 57 (H) 02/01/2009    AST 16 09/25/2017    ALT 17 09/25/2017    ALKPHOS  89 09/25/2017    BILITOTAL 0.4 09/25/2017     No results for input(s): CULT in the last 168 hours.  Recent Labs   Lab Test  12/21/16   1436  05/19/15   0900  03/11/14   1531  08/27/13   1108   CULT  50,000 to 100,000 colonies/mL mixed urogenital padmini  >100,000 colonies/mL Escherichia coli*  Beta hemolytic Streptococcus group B isolated*  No growth

## 2017-09-26 NOTE — H&P
[facility]    History and Physical  Hospitalist       Date of Admission:  9/26/2017  Date of Service (when I saw the patient): 09/26/17    Assessment & Plan   Liliam Galvan is a 32 year old female with no significant past medical history who presents with massive cervical and mediastinal lymphadenopathy.  She has been admitted to the hospital for further workup.    Massive cervical and mediastinal lymphadenopathy.  This is as seen on her CT of the neck.  She also has intermittent low-grade fevers, voice hoarseness, and mild cough.  No known TB exposure although she is from [facility], with last travel there in 2012.  Concern also for possibly for lymphoma.  CBC is unremarkable other than mild anemia.  Currently afebrile.  - Infectious disease and heme/onc consults ordered  - CT chest to rule out active pulmonary disease.   - Airborne isolation with negative airflow.  - Sputum for AFB if CT chest positive and is able to given any sputum.  QuantiFERON is pending.  - CT abdomen and pelvis to evaluate for any extension of lymphadenopathy/malignancy  - ENT consulted for FNA of the right neck LN.   - CT surgery consult given amount of mediastinal LN involvement.   - Fungal serologies and HIV ordered for completeness   - Hold off starting any antimicrobials for now.     DVT Prophylaxis: Pneumatic Compression Devices  Code Status: Full Code    Disposition: Expected discharge pending and initial workup results and input from consultants.    Zay Nye    Primary Care Physician   Dr. Amrik Weaver    Chief Complaint   Swollen cervical lymph nodes    History is obtained from the patient    History of Present Illness   Liliam Galvan is a 32 year old female with no significant past medical history who presented to clinic with complaints of neck swelling.  Patient has noticed Swelling over the last two months.  Initially she noticed a rash on her neck, which was felt to be from a scarf.  The rash resolved  but the swelling continued.  An ultrasound of her neck was done which showed lymphadenopathy.  A follow-up CT scan of the neck was completed yesterday  which shows massive lymphadenopathy of the mediastinum and right neck up to the level of the hyoid bone as well as mild lymphadenopathy in the supraclavicular left neck.  There is mass effect with leftward deviation of the aerodigestive tract from the larynx and upper thoracic trachea without significant narrowing of the airway.  Patient was seen in clinic to discuss the test results and was [facility].    Patient does note subjective intermittent low-grade fevers although has not measured this at home.  She has a mild cough and some hoarseness in the morning time.  She denies any chills or night sweats.  She had an intentional approximately 3 pound weight loss over the last couple months.  She does report some decreased appetite.  No shortness of breath or chest pain.  She does have some neck discomfort.  No lightheadedness, syncope, abdominal pain, bloating, nausea/vomiting, diarrhea, dysuria, no weakness, numbness or tingling.  No known tuberculosis exposures.  She emigrated here from [facility] approximately nine years ago, with her last return visit being in .    Past Medical History    Patient denies any past medical history    Past Surgical History   Patient denies any previous surgeries    Prior to Admission Medications   Prior to Admission Medications   Prescriptions Last Dose Informant Patient Reported? Taking?   Misc. Devices (BREAST PUMP) MISC   No No   Si each as needed   hydrocortisone (CORTAID) 1 % cream   No No   Sig: Apply sparingly to affected area three times daily for 14 days.   permethrin (ELIMITE) 5 % cream   No No   Sig: Apply cream from head to toe (except the face); leave on for 8-14 hours then wash off with water;   valACYclovir (VALTREX) 1000 mg tablet   No No   Sig: Take 1 tablet (1,000  mg) by mouth 3 times daily      Facility-Administered Medications: None     Allergies   Allergies   Allergen Reactions     Nkda [No Known Drug Allergies]        Social History   Patient emigrated here nine years ago from [facility].  She works as a .  She denies any history of tobacco use.  She does not drink alcohol.    Family History   Patient reports both of her parents are living and in good health.  She denies any family history of lymphoma, or cancers.  No family history of tuberculosis.    Review of Systems   The 10 point Review of Systems is negative other than noted in the HPI.    Physical Exam   Temp: 99.1  F (37.3  C) Temp src: Oral BP: 123/83 Pulse: 115   Resp: 18 SpO2: 100 % O2 Device: None (Room air)    Vital Signs with Ranges  Temp:  [97.9  F (36.6  C)-99.1  F (37.3  C)] 99.1  F (37.3  C)  Pulse:  [115-129] 115  Resp:  [18] 18  BP: (110-123)/(70-83) 123/83  SpO2:  [100 %] 100 %  157 lbs 0 oz    GENERAL: Alert, oriented to person, place, date. Cooperative and lying in bed in no acute distress.   EYES: Pupils equal, round. Extraocular movements intact.  HEENT:  Normocephalic, Mucous membranes moist. No tonsillar exudate or erythema.   NECK: Supple.  Large, firm cervical lymphadenopathy, right greater than left.  Some bilateral supraclavicular lymphadenopathy as well.  CARDIOVASCULAR: Regular rate and rhythm without murmurs, rubs, or gallops.   PULMONARY: Breath sounds clear, without crackles, wheezes, or rhonchi bilaterally. No accessory muscle use.   GASTROINTESTINAL: Soft and non-distended. Normoactive bowel sounds. Nontender in all 4 quadrants.  No masses.  MUSCULOSKELETAL: Strength 5/5 in upper and lower extremeties bilaterally.   SKIN: Warm, dry, no rashes.  No rashes appreciated on limited exam.  EXTREMITIES: Pulses 2+ in upper and lower extremities bilaterally. No lower extremity edema.    NEUROLOGIC: Cranial nerves II-XII intact.  Motor function grossly intact and equal in upper and  lower extremities bilaterally.   PSYCHIATRIC: Normal mood and affect.     Data   Data reviewed today:  I personally reviewed all labs and imaging results.    Recent Labs  Lab 09/25/17  0902   WBC 9.5   HGB 11.1*   MCV 83         POTASSIUM 4.0   CHLORIDE 104   CO2 27   BUN 5*   CR 0.52   ANIONGAP 8   JULIANA 9.0   GLC 93   ALBUMIN 3.4   PROTTOTAL 7.9   BILITOTAL 0.4   ALKPHOS 89   ALT 17   AST 16       Recent Results (from the past 24 hour(s))   CT Soft Tissue Neck w Contrast    Narrative    CT OF THE NECK WITH CONTRAST  9/25/2017 4:58 PM     COMPARISON: None.    HISTORY: Localized swelling, mass and lump, neck    TECHNIQUE:  Axial CT images of the neck were acquired after the  intravenous administration of 80mL Isovue-370 nonionic iodinated  contrast material. Coronal reconstructions were created.    FINDINGS: There is massive lymphadenopathy throughout the mediastinum.  There is mild lymphadenopathy in the supraclavicular left neck. There  is massive conglomerate lymphadenopathy in the supraclavicular right  neck with marked lymphadenopathy in the right neck extending up to the  level of the hyoid bone. There is no suprahyoid lymphadenopathy in  either side of the neck. There is extensive fat stranding intermingled  among the lymph nodes in the right neck suggesting an inflammatory  process. A few of the lymph nodes in the right neck demonstrate  low-density suggesting necrosis. While lymphatic malignancy must  certainly be considered, an infectious etiology suggest tuberculosis  must also be considered. Clinical correlation recommended.    There are no fluid collections or abscesses anyone the neck. The  salivary glands are unremarkable.    Although the thyroid gland itself is normal in contour and density,  there is marked leftward shift of the aerodigestive tract from the  level of the larynx down to the upper thoracic trachea. Despite this  midline shift, the airway from the nasopharynx to the cynthia  remains  widely patent.    There is no sinusitis or mastoiditis. The visualized bones are  unremarkable. The lung apices are clear.      Impression    IMPRESSION:  1. Massive lymphadenopathy of the mediastinum and right neck up to the  level of the hyoid bone as well as mild lymphadenopathy in the  supraclavicular left neck. While lymphatic malignancy must certainly  be considered, an infectious etiology such as tuberculosis is also  quite possible.  2. Mass effect with leftward deviation of the aerodigestive tract from  the larynx down to the upper thoracic trachea without significant  narrowing of the airway.  3. Otherwise, normal soft tissue neck CT.      Radiation dose for this scan was reduced using automated exposure  control, adjustment of the mA and/or kV according to patient size, or  iterative reconstruction technique    ALEC GREENE MD

## 2017-09-26 NOTE — NURSING NOTE
"Chief Complaint   Patient presents with     Consult     f/u from yesterday OV       Initial /70 (BP Location: Left arm, Cuff Size: Adult Regular)  Pulse 129  Temp 97.9  F (36.6  C) (Oral)  Ht 5' 3\" (1.6 m)  Wt 157 lb (71.2 kg)  LMP 09/18/2017  SpO2 100%  Breastfeeding? No  BMI 27.81 kg/m2 Estimated body mass index is 27.81 kg/(m^2) as calculated from the following:    Height as of this encounter: 5' 3\" (1.6 m).    Weight as of this encounter: 157 lb (71.2 kg).  Medication Reconciliation: complete   Ignacia Abernathy CMA      "

## 2017-09-26 NOTE — IP AVS SNAPSHOT
[facility] [address] [facility]    [address] [place] [address]    Phone:  [phone]                                       After Visit Summary   9/26/2017    Liliam Galvan    MRN: [id number]           After Visit Summary Signature Page     I have received my discharge instructions, and my questions have been answered. I have discussed any challenges I see with this plan with the nurse or doctor.    ..........................................................................................................................................  Patient/Patient Representative Signature      ..........................................................................................................................................  Patient Representative Print Name and Relationship to Patient    ..................................................               ................................................  Date                                            Time    ..........................................................................................................................................  Reviewed by Signature/Title    ...................................................              ..............................................  Date                                                            Time

## 2017-09-26 NOTE — CONSULTS
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  She reports currently feeling well and denies a sore throat, hoarseness, difficulty swallowing, or shortness of breath.  She has some mild stiffness in her right neck but does not complain of neck pain.  A neck CT shows massive adenopathy in her mediastinum extending into her right neck to the level of the hyoid and in the left to her supraclavicular neck.  Her sinuses, mastoids, oropharynx, endolarynx, and thyroid are without any masses/visualized pathology on imaging.  Her aerodigestive tract is shifted left.  Previous tests and diagnostic procedures: see \"Tests and Procedures\" and \"PFSH\".               Past Medical History:     Past Medical History:   Diagnosis Date     NO ACTIVE PROBLEMS                Past Surgical History:     Past Surgical History:   Procedure Laterality Date     NO HISTORY OF SURGERY                 Social History:     Social History     Social History     Marital status:      Spouse name: Ysabel     Number of children: N/A     Years of education: 17     Occupational History      [employer]     Social History Main Topics     Smoking status: Never Smoker     Smokeless tobacco: Never Used     Alcohol use No     Drug use: No     Sexual activity: Yes     Partners: Male     Other Topics Concern     Not on file     Social History Narrative               Family " "History:     Family History   Problem Relation Age of Onset     Family History Negative Mother      CEREBROVASCULAR DISEASE Paternal Grandfather      DIABETES No family hx of      Coronary Artery Disease No family hx of      Hypertension No family hx of      Hyperlipidemia No family hx of      Breast Cancer No family hx of      Cancer - colorectal No family hx of      Ovarian Cancer No family hx of      Prostate Cancer No family hx of      Depression/Anxiety No family hx of      Anesthesia Reaction No family hx of      Thyroid Disease No family hx of      Asthma No family hx of      OSTEOPOROSIS No family hx of      Chemical Addiction No family hx of      Known Genetic Syndrome No family hx of                Immunizations:     Immunization History   Administered Date(s) Administered     Influenza (IIV3) 10/28/2013, 09/01/2016     Influenza Vaccine IM 3yrs+ 4 Valent IIV4 09/24/2015     TDAP Vaccine (Adacel) 02/26/2014     TDAP Vaccine (Boostrix) 09/24/2015               Allergies:   Nkda [no known drug allergies]          Medications:     No current outpatient prescriptions on file.             Review of Systems:   Neck swelling and mild right sided stiffness          Physical exam:   CONSTITUTION:    /83  Pulse 115  Temp 99.1  F (37.3  C) (Oral)  Resp 18  Ht 1.575 m (5' 2\")  Wt 71.2 kg (157 lb)  LMP 09/18/2017  SpO2 100%  BMI 28.72 kg/m2     General appearance:Well developed, well nourished and groomed. No apparent acute or chronic distress. Awake, alert and appropriate.    Voice-normal and without hoarseness.  No audible wheezing or stridor.  Nose: septum straight, minimal congestion and clear drainage.  OC/OP-healthy appearing moist mucous membranes, teeth appear to be in reasonably good repair.  Tongue normally mobile.  No trismus.  Pharynx clear--no erythema, exudate, or lesions.    Neck:  Extensive adenopathy of lower half or right neck--firm fixed nodes, non-tender to palpation, no fluctuance, " overlying skin intact and without obvious erythema.  Firm adenopathy left supraclavicular neck                      Data:   All laboratory data reviewed  All imaging studies reviewed by me.     Assessment and Plan:   Liliam is a pleasant 32-year old female originally from [facility] who last traveled there in 2012 who is admitted with some mild fluctuating URI symptoms and progressive largely asymptomatic neck swelling over the past 2 months who's neck CT reveals massive mediastinal adenopathy with extension to the neck to the level of the hyoid on the right and supraclavicular neck on the left.  Her head and neck exam is otherwise unrevealing.  TB testing, fungal serologies, and HIV testing are pending.  The patient was discussed with Dr. Nye who kindly indicated he will order the recommended US guided neck FNA.    Attestation:  I have reviewed today's vital signs, notes, medications, medication allergies, and PFSH/ROSlabs and imaging.    Summer Becerril MD

## 2017-09-26 NOTE — MR AVS SNAPSHOT
After Visit Summary   9/26/2017    Liliam Galvan    MRN: [id number]           Patient Information     Date Of Birth          1985        Visit Information        Provider Department      9/26/2017 9:00 AM Amrik Weaver MD [facility]        Today's Diagnoses     Lymphadenopathy    -  1       Follow-ups after your visit        Future tests that were ordered for you today     Open Future Orders        Priority Expected Expires Ordered    CT Soft Tissue Neck w Contrast Routine  9/25/2018 9/25/2017            Who to contact     If you have questions or need follow up information about today's clinic visit or your schedule please contact [facility] directly at [phone].  Normal or non-critical lab and imaging results will be communicated to you by MyChart, letter or phone within 4 business days after the clinic has received the results. If you do not hear from us within 7 days, please contact the clinic through [employer] or phone. If you have a critical or abnormal lab result, we will notify you by phone as soon as possible.  Submit refill requests through [employer] or call your pharmacy and they will forward the refill request to us. Please allow 3 business days for your refill to be completed.          Additional Information About Your Visit        MyChart Information     [employer] gives you secure access to your electronic health record. If you see a primary care provider, you can also send messages to your care team and make appointments. If you have questions, please call your primary care clinic.  If you do not have a primary care provider, please call [phone] and they will assist you.        Care EveryWhere ID     This is your Care EveryWhere ID. This could be used by other organizations to access your [facility] medical records  [id number]        Your Vitals Were     Pulse Temperature Height Last Period Pulse Oximetry Breastfeeding?    129 97.9  F  "(36.6  C) (Oral) 5' 3\" (1.6 m) 09/18/2017 100% No    BMI (Body Mass Index)                   27.81 kg/m2            Blood Pressure from Last 3 Encounters:   09/26/17 110/70   09/25/17 110/70   07/07/17 110/66    Weight from Last 3 Encounters:   09/26/17 157 lb (71.2 kg)   09/25/17 157 lb (71.2 kg)   07/07/17 161 lb (73 kg)              Today, you had the following     No orders found for display       Primary Care Provider Office Phone # Fax #    Rashard Lo [phone]       [address] [facility]  [address]  [facility] [address]        Equal Access to Services     DORA RIVERO : Hadii aad ku hadasho Soomaali, waaxda luqadaha, qaybta kaalmada adeegyada, annabelle almodovarin huy valverde . So [facility] [phone].    ATENCIÓN: Si habla español, tiene a arizmendi disposición servicios gratuitos de asistencia lingüística. Llame al [phone].    We comply with applicable federal civil rights laws and [place] laws. We do not discriminate on the basis of race, color, national origin, age, disability sex, sexual orientation or gender identity.            Thank you!     Thank you for choosing [facility]  for your care. Our goal is always to provide you with excellent care. Hearing back from our patients is one way we can continue to improve our services. Please take a few minutes to complete the written survey that you may receive in the mail after your visit with us. Thank you!             Your Updated Medication List - Protect others around you: Learn how to safely use, store and throw away your medicines at www.disposemymeds.org.          This list is accurate as of: 9/26/17 10:04 AM.  Always use your most recent med list.                   Brand Name Dispense Instructions for use Diagnosis    breast pump Misc     1 each    1 each as needed    Ineffective breast feeding       hydrocortisone 1 % cream    CORTAID    30 g    Apply sparingly to affected area three times daily for 14 days.    " Rash of neck       permethrin 5 % cream    ELIMITE    60 g    Apply cream from head to toe (except the face); leave on for 8-14 hours then wash off with water;    Rash and nonspecific skin eruption       valACYclovir 1000 mg tablet    VALTREX    21 tablet    Take 1 tablet (1,000 mg) by mouth 3 times daily    Rash of neck

## 2017-09-26 NOTE — IP AVS SNAPSHOT
MRN:[id number]                      After Visit Summary   9/26/2017    Liliam Galvan    MRN: [id number]           Thank you!     Thank you for choosing [facility] for your care. Our goal is always to provide you with excellent care. Hearing back from our patients is one way we can continue to improve our services. Please take a few minutes to complete the written survey that you may receive in the mail after you visit with us. Thank you!        Patient Information     Date Of Birth          1985        Designated Caregiver       Most Recent Value    Caregiver    Will someone help with your care after discharge? yes    Name of designated caregiver Carlos Alberto     Phone number of caregiver [phone]    Caregiver address [place]       About your hospital stay     You were admitted on:  September 26, 2017 You last received care in the:  [place] [address] [facility]    You were discharged on:  September 27, 2017        Reason for your hospital stay       Evaluation of your swollen lymph nodes, which was concerning for either an infection or lymphoma. A biopsy was done and initial pathology indicates you presumably have lymphoma.                  Who to Call     For medical emergencies, please call 911.  For non-urgent questions about your medical care, please call your primary care provider or clinic, [phone]          Attending Provider     Provider Specialty    Carey Garcia,  Internal Medicine       Primary Care Provider Office Phone # Fax #    [facility] [phone]      After Care Instructions     Activity       Your activity upon discharge: activity as tolerated            Diet       Follow this diet upon discharge: Regular                  Follow-up Appointments     Follow-up and recommended labs and tests        1. You will need to follow up with Dr. Diez ([facility]) in clinic in the next few days with to review your  "testing and determine an optimal treatment plan going forward.                  Your next 10 appointments already scheduled     Oct 03, 2017  5:20 PM CDT   [facility]    [address]  [facility] [id number] [phone]              Further instructions from your care team       Appointment Wednesday October 4th, Check in 2:10pm with Dr. Diez.  [address]  [facility] #[phone] Dr. Diez-follow up with final biopsy results.      Pending Results     Date and Time Order Name Status Description    9/26/2017 1503 Surgical pathology exam In process     9/26/2017 1443 Leukemia Lymphoma Evaluation (Flow Cytometry) In process     9/26/2017 1118 Histoplasma Capsulatum Agn Non Blood In process     9/26/2017 1118 Histoplasma capsulatum antigen In process     9/26/2017 1118 Fungal antibodies In process     9/26/2017 1117 XR Chest 2 Views Preliminary             Statement of Approval     Ordered          09/27/17 1440  I have reviewed and agree with all the recommendations and orders detailed in this document.  EFFECTIVE NOW     Approved and electronically signed by:  Carey Garcia DO             Admission Information     Date & Time Provider Department Dept. Phone    9/26/2017 Carey Garcia DO [place] [address] [facility] [phone]      Your Vitals Were     Blood Pressure Pulse Temperature Respirations Height Weight    109/68 (BP Location: Right arm) 94 98.3  F (36.8  C) (Oral) 16 1.575 m (5' 2\") 71.2 kg (157 lb)    Last Period Pulse Oximetry BMI (Body Mass Index)             09/18/2017 100% 28.72 kg/m2         [employer] Information     [employer] gives you secure access to your electronic health record. If you see a primary care provider, you can also send messages to your care team and make appointments. If you have " questions, please call your primary care clinic.  If you do not have a primary care provider, please call [phone] and they will assist you.        Care EveryWhere ID     This is your Care EveryWhere ID. This could be used by other organizations to access your [facility] medical records  [id number]        Equal Access to Services     PALOMO RIVERO : Carlota benitez Somerle, waaxda luqadaha, qaybta kaalmanathaniel taylor, annabelle london. So [facility] [place] [phone].    ATENCIÓN: Si habla español, tiene a arizmendi disposición servicios gratuitos de asistencia lingüística. Rd al [phone].    We comply with applicable federal civil rights laws and [place] laws. We do not discriminate on the basis of race, color, national origin, age, disability sex, sexual orientation or gender identity.               Review of your medicines      CONTINUE these medicines which have NOT CHANGED        Dose / Directions    IBUPROFEN PO        Take by mouth as needed for moderate pain OTC dose unknown   Refills:  0                Protect others around you: Learn how to safely use, store and throw away your medicines at www.disposemymeds.org.             Medication List: This is a list of all your medications and when to take them. Check marks below indicate your daily home schedule. Keep this list as a reference.      Medications           Morning Afternoon Evening Bedtime As Needed    IBUPROFEN PO   Take by mouth as needed for moderate pain OTC dose unknown

## 2017-09-26 NOTE — CONSULTS
REQUESTING PHYSICIAN:  Zay Nye PA-C      REASON FOR CONSULTATION:  Cervical and mediastinal lymphadenopathy.      HISTORY:  Medical records reviewed, history obtained and patient examined.      Liliam Galvan is a 32-year-old premenopausal woman from [place] [employer] [place] who presented with cervical lymphadenopathy, fatigue, low-grade fever and decreased appetite.  She had these symptoms for the last 2-3 months, has no significant pain associated with lymphadenopathy and she was trying to lose weight with a 3 pound weight loss over a couple of months but does have decreased appetite.  She denies any breathing difficulty or change of 4 weeks.  She was hospitalized with mass effect with leftward deviation and underwent a needle biopsy this afternoon from her cervical lymph node.  CT scan of chest, abdomen and pelvis showed marked bilateral cervical adenopathy, greater on the right causing severe compression of the right internal jugular and left internal jugular bilateral subclavian and bilateral brachiocephalic veins, extensive mediastinal lymphadenopathy and extension into right pulmonary hilum.  Images were reviewed independently.      PAST MEDICAL HISTORY:  None.  No surgeries.      MEDICATIONS PRIOR TO ADMISSION:  Topical cream and Valtrex.      ALLERGIES:  None.      SOCIAL HISTORY:  She emigrated from [place] 9 years ago.  Works as a .  Nonsmoker, no alcohol.      FAMILY HISTORY:  Negative for blood disorders or malignancy.      REVIEW OF SYSTEMS:  A 10-point system review is negative except for the positives mentioned in the HPI.  She has mild headache which she attributes to not eating for 24 hours, preparing for the tests.      PHYSICAL EXAMINATION:   GENERAL:  Pleasant, in no acute distress.   VITAL SIGNS:  Stable as charted.  Temperature 99.1.  Her weight is 157.   HEENT:  Normocephalic, atraumatic, sclerae anicteric.  Extraocular movements intact.   NECK:  Significant lymphadenopathy, more on  the right with no discrete mass.   LUNGS:  Clear to auscultation, no dullness.   HEART:  Regular rhythm, no murmurs.   ABDOMEN:  Soft, nontender, no organomegaly.   EXTREMITIES:  No cyanosis or edema.   LYMPHATIC:  No lymphadenopathy in cervical, supraclavicular, axillary, epitrochlear or inguinal areas.   NEUROLOGIC:  Grossly intact.   SKIN:  Limited skin examination, no petechia or ecchymosis, no rash.      ANCILLARY DATA:  A scan as detailed labs from  shows creatinine 0.52, calcium 9.  Liver enzymes normal.  HCG negative.  QuantiFERON test is pending.  White count 9.5, hemoglobin 11.1, platelets 347, MCV 83.  Ultrasound-guided biopsy was done today, results are pending.      IMPRESSION:   1.  Extensive mediastinal and cervical lymphadenopathy concerning for malignancy, tissue diagnosis is pending.     2.  Mild anemia with MCV on the lower side, could be related to iron deficiency or associated neoplastic condition.      DISCUSSION AND RECOMMENDATIONS:  I agree with core biopsy.  Will follow up on results to discuss further recommendations.  Will add LDH and ferritin.  I appreciate the opportunity to participate in the care of Liliam Whitney.         JUMA SMITH MD             D: 2017 15:50   T: 2017 16:14   MT: RONNI      Name:     LILIAM WHITNEY   MRN:      [id number]        Account:       [id number]   :      1985           Consult Date:  2017      Document: [id number]

## 2017-09-26 NOTE — PROGRESS NOTES
"  SUBJECTIVE:   Liliam Galvan is a 32 year old female who presents to clinic today for the following health issues:    F/U lymphadenopathy R neck      HISTORY    I contacted her last night and arranged for her to come in. Please see previous note. CT result discussed.    CT Neck:  IMPRESSION:  1. Massive lymphadenopathy of the mediastinum and right neck up to the  level of the hyoid bone as well as mild lymphadenopathy in the  supraclavicular left neck. While lymphatic malignancy must certainly  be considered, an infectious etiology such as tuberculosis is also  quite possible.  2. Mass effect with leftward deviation of the aerodigestive tract from  the larynx down to the upper thoracic trachea without significant  narrowing of the airway.  3. Otherwise, normal soft tissue neck CT.    She is symptomatic with low grade temp, throat tightness, decr appetite and modest wt loss.    Patient Active Problem List   Diagnosis     CARDIOVASCULAR SCREENING; LDL GOAL LESS THAN 160     PCOS (polycystic ovarian syndrome)       REVIEW OF SYSTEMS    No SOB  No numbness or weakness  No N and V  No edema      Past Medical History:   Diagnosis Date     NO ACTIVE PROBLEMS        EXAM  /70 (BP Location: Left arm, Cuff Size: Adult Regular)  Pulse 129  Temp 97.9  F (36.6  C) (Oral)  Ht 5' 3\" (1.6 m)  Wt 157 lb (71.2 kg)  LMP 09/18/2017  SpO2 100%  Breastfeeding? No  BMI 27.81 kg/m2    NAD  Large area of adenopathy R supraclavicular and R neck  No stridor  No resp distress      (R59.1) Lymphadenopathy  (primary encounter diagnosis)  Comment:   Plan:     Situation discussed with Dr Wilkinson, Neuroradiology.  Patient placement contacted. Admission arranged. Dr Garcia accepting at [facility].      This was a 25 min visit.      "

## 2017-09-26 NOTE — PLAN OF CARE
Problem: Patient Care Overview  Goal: Plan of Care/Patient Progress Review  Outcome: No Change  Patient admitted today with right neck swelling from clinic.  Patient A&Ox4.  Up independently in room.  Vitals stable, no fevers or chills.  Nonproductive, dry cough.  ID/ENT/Hematology consults.  Airborne Isolation to rule out TB.  Chest xray/CT/ and US guided biopsy lymph node ordered.  Denies pain.  Denies SOB. Lungs clear.  Continue to monitor.

## 2017-09-27 VITALS
HEART RATE: 94 BPM | DIASTOLIC BLOOD PRESSURE: 68 MMHG | SYSTOLIC BLOOD PRESSURE: 109 MMHG | OXYGEN SATURATION: 100 % | RESPIRATION RATE: 16 BRPM | BODY MASS INDEX: 28.89 KG/M2 | HEIGHT: 62 IN | TEMPERATURE: 98.3 F | WEIGHT: 157 LBS

## 2017-09-27 LAB
ANION GAP SERPL CALCULATED.3IONS-SCNC: 9 MMOL/L (ref 3–14)
BUN SERPL-MCNC: 9 MG/DL (ref 7–30)
CALCIUM SERPL-MCNC: 9.1 MG/DL (ref 8.5–10.1)
CHLORIDE SERPL-SCNC: 103 MMOL/L (ref 94–109)
CO2 SERPL-SCNC: 26 MMOL/L (ref 20–32)
CREAT SERPL-MCNC: 0.56 MG/DL (ref 0.52–1.04)
ERYTHROCYTE [DISTWIDTH] IN BLOOD BY AUTOMATED COUNT: 14.8 % (ref 10–15)
FERRITIN SERPL-MCNC: 62 NG/ML (ref 12–150)
GFR SERPL CREATININE-BSD FRML MDRD: >90 ML/MIN/1.7M2
GLUCOSE SERPL-MCNC: 81 MG/DL (ref 70–99)
HCT VFR BLD AUTO: 34.8 % (ref 35–47)
HGB BLD-MCNC: 11.4 G/DL (ref 11.7–15.7)
HIV 1+2 AB+HIV1 P24 AG SERPL QL IA: NONREACTIVE
LDH SERPL L TO P-CCNC: 273 U/L (ref 81–234)
M TB TUBERC IFN-G BLD QL: NEGATIVE
M TB TUBERC IFN-G/MITOGEN IGNF BLD: 0 IU/ML
MCH RBC QN AUTO: 26.4 PG (ref 26.5–33)
MCHC RBC AUTO-ENTMCNC: 32.8 G/DL (ref 31.5–36.5)
MCV RBC AUTO: 81 FL (ref 78–100)
PLATELET # BLD AUTO: 313 10E9/L (ref 150–450)
POTASSIUM SERPL-SCNC: 3.7 MMOL/L (ref 3.4–5.3)
RBC # BLD AUTO: 4.32 10E12/L (ref 3.8–5.2)
SODIUM SERPL-SCNC: 138 MMOL/L (ref 133–144)
WBC # BLD AUTO: 8.7 10E9/L (ref 4–11)

## 2017-09-27 PROCEDURE — 99239 HOSP IP/OBS DSCHRG MGMT >30: CPT | Performed by: INTERNAL MEDICINE

## 2017-09-27 PROCEDURE — 88184 FLOWCYTOMETRY/ TC 1 MARKER: CPT | Performed by: PATHOLOGY

## 2017-09-27 PROCEDURE — 88185 FLOWCYTOMETRY/TC ADD-ON: CPT | Performed by: PATHOLOGY

## 2017-09-27 PROCEDURE — 83615 LACTATE (LD) (LDH) ENZYME: CPT | Performed by: PHYSICIAN ASSISTANT

## 2017-09-27 PROCEDURE — 36415 COLL VENOUS BLD VENIPUNCTURE: CPT | Performed by: PHYSICIAN ASSISTANT

## 2017-09-27 PROCEDURE — 85027 COMPLETE CBC AUTOMATED: CPT | Performed by: PHYSICIAN ASSISTANT

## 2017-09-27 PROCEDURE — 80048 BASIC METABOLIC PNL TOTAL CA: CPT | Performed by: PHYSICIAN ASSISTANT

## 2017-09-27 PROCEDURE — 25000132 ZZH RX MED GY IP 250 OP 250 PS 637: Performed by: PHYSICIAN ASSISTANT

## 2017-09-27 PROCEDURE — 40001004 ZZHCL STATISTIC FLOW INT 9-15 ABY TC 88188: Performed by: PATHOLOGY

## 2017-09-27 PROCEDURE — 82728 ASSAY OF FERRITIN: CPT | Performed by: PHYSICIAN ASSISTANT

## 2017-09-27 RX ADMIN — ACETAMINOPHEN 650 MG: 325 TABLET, FILM COATED ORAL at 04:36

## 2017-09-27 NOTE — PLAN OF CARE
Problem: Patient Care Overview  Goal: Plan of Care/Patient Progress Review  Outcome: No Change  Pt is A/O x 4. Up independently. VSS on RA. LS clear. Denies SOB. Airborne iso for TB rule out. Complained of headache, Tylenol x 1 given with relief. R side of the neck swollen, pt reports some discomfort in the neck. Hot packs used for comfort. Thoracic surgery/Hematology/Oncology are following. Nursing continue to monitor.

## 2017-09-27 NOTE — PLAN OF CARE
Problem: Patient Care Overview  Goal: Plan of Care/Patient Progress Review  Outcome: Adequate for Discharge Date Met:  09/27/17   A&Ox4. Discharge home with appointment set up to see Dr. Diez on Wednesday October 4th, check in 2:10pm @ [facility]

## 2017-09-27 NOTE — PROGRESS NOTES
"[facility]    Infectious Disease Progress Note    Date of Service (when I saw the patient): 09/27/2017     Assessment & Plan   Liliam Galvan is a 32 year old female who was admitted on 9/26/2017.     Impression:  32 y.o female admitted with months of on and off swelling mostly in the right cervical lymph node chain.   Also some fevers, voice hoarseness and cough.   Patient from [facility], unknown PPD. No known contacts with Tuberculosis and last travel to [facility] was in 2012.   Qunatiferon is negative.   Neck CT positive for \" Massive lymphadenopathy of the mediastinum and right neck up to the  level of the hyoid bone as well as mild lymphadenopathy in the  supraclavicular left neck\"   CT chest positive for pulmonary nodules.   FNA done prelim: Discussed with pathology, biopsy shows sclerotic lymphoid tissue, immunophenotyping just sent and will need at least a day or 2 for results, there is no evidence of TB on the biopsy.      Recommendations:   Quant TB is negative.   No signs of TB in the pathology.   Remove isolation, will follow up on the final on the path, but given above mentioned negative most likely no TB. .     Appreciate Dr. Jonas reviewing the CT scan, no concern of compression on any major organs.       Jv Thornton MD    Interval History   Afebrile   CT chest positive for pulmonary nodules.     Physical Exam   Temp: 98.8  F (37.1  C) Temp src: Oral BP: 124/84 Pulse: 112   Resp: 18 SpO2: 95 % O2 Device: None (Room air)    Vitals:    09/26/17 1106   Weight: 71.2 kg (157 lb)     Vital Signs with Ranges  Temp:  [98.3  F (36.8  C)-99.3  F (37.4  C)] 98.8  F (37.1  C)  Pulse:  [] 112  Resp:  [16-18] 18  BP: ()/(58-84) 124/84  SpO2:  [95 %-100 %] 95 %    Constitutional: Awake, alert, cooperative, no apparent distress  Lungs: Clear to auscultation bilaterally, no crackles or wheezing  Cardiovascular: Regular rate and rhythm, normal S1 and S2, and no murmur noted  Abdomen: Normal " bowel sounds, soft, non-distended, non-tender  Skin: No rashes, no cyanosis, no edema  Other:    Medications        sodium chloride (PF)  3 mL Intracatheter Q8H       Data   All microbiology laboratory data reviewed.  Recent Labs   Lab Test  09/25/17   0902  07/03/17   0854  12/21/16   1435   WBC  9.5  8.3  10.8   HGB  11.1*  11.6*  12.0   HCT  35.3  36.7  38.0   MCV  83  84  84   PLT  347  304  362     Recent Labs   Lab Test  09/25/17   0902  07/03/17   0854   CR  0.52  0.55     No lab results found.  Recent Labs   Lab Test  12/21/16   1436  05/19/15   0900  03/11/14   1531  08/27/13   1108   CULT  50,000 to 100,000 colonies/mL mixed urogenital padmini  >100,000 colonies/mL Escherichia coli*  Beta hemolytic Streptococcus group B isolated*  No growth

## 2017-09-27 NOTE — PLAN OF CARE
Problem: Patient Care Overview  Goal: Plan of Care/Patient Progress Review  Outcome: No Change  Patient A&Ox4.  Right neck area swollen. Up independently in room.  VSS except tachycardiac.  Nonproductive, dry cough.  ID/ENT/Hematology consults.  Airborne Isolation to rule out TB.  Chest xray/CT/ and US guided biopsy lymph node ordered, results pending. Complained of discomfort in right neck side after biopsy, given hot pack for comfort with relief, otherwise denies pain.  Denies SOB. Lungs clear.

## 2017-09-27 NOTE — PROGRESS NOTES
[facility]    Hospitalist Progress Note    Assessment & Plan   Liliam Galvan is a 32 year old female originally from [facility] with no significant PMHx who was admitted on 9/26/2017 from PCP's clinic for evaluation of progressive neck swelling with findings of massive cervical and mediastinal lymphadenopathy seen on outpatient CT.    Massive Cervical and Mediastinal Lymphadenopathy:  Patient noticed progressive neck swelling over the preceding two months. An US of the neck showed presence of lymphadenopathy. A CT of the neck was obtained and showed massive lymphadenopathy of the mediastinum and right neck  And mild lymphadenopathy of the supraclavicular left neck. She was admitted directly from clinic for further evaluation with ddx including lymphoma vs infectious etiology such as TB. CT chest/abd/pelvis was obtained and showed massive cervical, mediastinal and R hilar lymphadenopathy with some abnl reticulonodular opacities in the RML. Endorsed dry cough, no hemoptysis. No known TB exposure. Last traveled to [facility] in 2012.  -- has been seen by multiple specialists this stay including ID, oncology and ENT  -- Quantiferon gold obtained in clinic on 9/25 and is negative  -- HIV neg  -- histoplasma and fungal Abs pending  -- patient has been unable to provide sputum sample for AFB culture  -- underwent LN biopsy on 9/26 -- Dr. Diez discussed with pathology on 9/27 -- biopsy showed sclerotic lymphoid tissue, additional testing pending but will takes few days to return, no indication of TB on the biopsy -- can follow up in oncology clinic as outpatient discuss results and plan for treatment  -- definitive treatment plan based on final pathology results    FEN: no IVFs, lytes stable, regular diet  DVT Prophylaxis: PCDs  Code Status: Full Code    Disposition: Pending recommendations per ID.    Carey Garcia    Interval History   Patient resting comfortably this afternoon. Endorses ongoing  dry cough. Hasn't been able to produce sputum for AFB stain. Has lots of questions about ddx and ultimate treatment plan.     -Data reviewed today: I reviewed all new labs and imaging results over the last 24 hours. I personally reviewed no images or EKG's today.    Physical Exam   Temp: 98.3  F (36.8  C) Temp src: Oral BP: 120/74 Pulse: 108   Resp: 16 SpO2: 100 % O2 Device: None (Room air)    Vitals:    09/26/17 1106   Weight: 71.2 kg (157 lb)     Vital Signs with Ranges  Temp:  [98.3  F (36.8  C)-99.3  F (37.4  C)] 98.3  F (36.8  C)  Pulse:  [] 108  Resp:  [16-18] 16  BP: ()/(58-84) 120/74  SpO2:  [95 %-100 %] 100 %  I/O last 3 completed shifts:  In: 710 [P.O.:710]  Out: -     Constitutional: Resting comfortably, alert and conversing appropriately, NAD  Respiratory: CTAB, no wheeze/rales/rhonchi  Cardiovascular: HRRR, no MGR  GI: S, NT, ND, +BS  Skin/Integumen: warm/dry  Other:      Medications        sodium chloride (PF)  3 mL Intracatheter Q8H       Data     Recent Labs  Lab 09/27/17  0912 09/25/17  0902   WBC 8.7 9.5   HGB 11.4* 11.1*   MCV 81 83    347    139   POTASSIUM 3.7 4.0   CHLORIDE 103 104   CO2 26 27   BUN 9 5*   CR 0.56 0.52   ANIONGAP 9 8   JULIANA 9.1 9.0   GLC 81 93   ALBUMIN  --  3.4   PROTTOTAL  --  7.9   BILITOTAL  --  0.4   ALKPHOS  --  89   ALT  --  17   AST  --  16       Recent Results (from the past 24 hour(s))   CT Chest/Abdomen/Pelvis w Contrast    Narrative    CT CHEST, ABDOMEN AND PELVIS WITH CONTRAST   9/26/2017 2:34 PM     HISTORY: Massive lymphadenopathy.    TECHNIQUE: 77 mL Isovue-370. CT images of the chest, abdomen, and  pelvis following oral and nonionic intravenous contrast. Radiation  dose for this scan was reduced using automated exposure control,  adjustment of the mA and/or kV according to patient size, or iterative  reconstruction technique.    COMPARISON: None.    FINDINGS:     Chest: There is marked bilateral cervical adenopathy, right greater  than  left. Adenopathy causes severe compression of the right internal  jugular, left internal jugular, bilateral subclavian, and bilateral  brachiocephalic veins. Extensive mediastinal adenopathy is in the  prevascular space, around the great vessels, in the pretracheal and  subcarinal regions, and extends into the right pulmonary hilum. There  is no left hilar adenopathy. No axillary adenopathy.    There are innumerable tiny nodular opacities in the right middle lobe.  The remainder of the lungs are clear. No pleural or pericardial  effusion. Thyroid gland appears normal.    Abdomen: High density material in the gallbladder is likely excreted  contrast. The liver, spleen, pancreas, and adrenal glands appear  normal. There is no hydronephrosis or hydroureter. No solid renal  lesions. No abdominal or retroperitoneal adenopathy.    Pelvis: The uterus and adnexa are unremarkable. No pelvic free fluid.  No pelvic lymphadenopathy or mass.      Impression    IMPRESSION: Massive cervical, mediastinal, and right hilar  lymphadenopathy. There are also abnormal reticulonodular opacities in  the right middle lobe. Diagnostic possibilities include lymphoma and  tuberculosis. Other atypical infections not excluded.    ALE WASHINGTON MD   XR Chest 2 Views    Narrative    CHEST TWO VIEWS   9/26/2017 2:55 PM     HISTORY: Rule out TB. CT from prior day shows massive lymphadenopathy.    COMPARISON: 2/2/2009. Chest CT 9/26/2017.    FINDINGS: Again noted there is a mass along the right side of the  mediastinum, right hilum and to a lesser degree the left superior  mediastinum, consistent with massive lymphadenopathy seen on recent  CT. Reticulonodular opacities are seen throughout the right middle  lobe. No pneumothorax or pleural effusion. No acute osseous  abnormality.      Impression    IMPRESSION: Mediastinal and right hilar mass, consistent with  adenopathy on recent CT. Reticulonodular opacities are also seen in  the right middle  lobe. Differential includes lymphoma or tuberculosis.  Other atypical infections are not excluded.   US Biopsy Lymph Node Core    Narrative    ULTRASOUND BIOPSY CORE LYMPH NODE  9/26/2017 3:20 PM     HISTORY: Right cervical lymphadenopathy.    COMPARISON: None.    MEDICATIONS: 5 mL 1% lidocaine.     TECHNIQUE: Informed consent was obtained from the patient. A timeout  was performed. The skin was prepped and draped in a sterile manner. 1%  lidocaine was used for local anesthesia. Under ultrasound guidance, a  17-gauge access needle was advanced into an abnormally enlarged right  cervical lymph node. Through this needle, multiple 18-gauge core  biopsy specimens were obtained and submitted to pathology. The patient  tolerated the procedure well with no immediate complications.    FINDINGS: Limited ultrasound images demonstrate needle access to an  enlarged hypoechoic lymph node in the right neck.      Impression    IMPRESSION: Ultrasound-guided biopsy of a right cervical lymph node.    ALE WASHINGTON MD

## 2017-09-27 NOTE — PLAN OF CARE
Problem: Patient Care Overview  Goal: Plan of Care/Patient Progress Review  Outcome: No Change  Patient A&Ox4.  Up independently in room.  Vitals stable, except tachy HR low 100's, no fevers or chills.  Denies pain.  Neck right side swollen.  Denies SOB.  Lungs clear.  Nonproductive, dry cough, unable to get sputum sample.  ID/Hematology following.  Awaiting for ID for plan.   @ bedside.  Continue to monitor.

## 2017-09-27 NOTE — PROGRESS NOTES
[facility]  Hematology/oncology Progress Note            History:   Liliam Galvan is a 32-year-old premenopausal woman from [facility] who presented with cervical lymphadenopathy, fatigue, low-grade fever and decreased appetite.  She had these symptoms for the last 2-3 months, has no significant pain associated with lymphadenopathy and she was trying to lose weight with a 3 pound weight loss over a couple of months but does have decreased appetite.  She denies any breathing difficulty or change of 4 weeks.  She was hospitalized with mass effect with leftward deviation and underwent a needle biopsy this afternoon from her cervical lymph node.  CT scan of chest, abdomen and pelvis showed marked bilateral cervical adenopathy, greater on the right causing severe compression of the right internal jugular and left internal jugular bilateral subclavian and bilateral brachiocephalic veins, extensive mediastinal lymphadenopathy and extension into right pulmonary hilum.   She feels same today, had biopsy yesterday without complications         Medications:       sodium chloride (PF)  3 mL Intracatheter Q8H                ROS:   RESP, CV, GI,, MUSCULOSKELETAL, HEME/ALLERGY/IMMUNE,  Skin: reviewed and negative except the positives mentioned in this note            Physical Exam:       Vital Sign Ranges  Temp:  [98.3  F (36.8  C)-99.3  F (37.4  C)] 98.8  F (37.1  C)  Pulse:  [] 112  Resp:  [16-18] 18  BP: ()/(58-84) 124/84  SpO2:  [95 %-100 %] 95 %      I/O last 3 completed shifts:  In: 710 [P.O.:710]  Out: -     Constitutional: Awake, alert, cooperative, no apparent distress  HEENT: unremarkable  Neck: lymphadenopathy  Lungs: No increased work of breathing, good air exchange, clear to auscultation bilaterally, no crackles or wheezing.  Cardiovascular: Regular rate and rhythm, normal S1 and S2,no murmur noted.  Abdomen: No scars, normal bowel sounds, soft, non-distended, non-tender, no masses  palpated, no hepatosplenomegally.  Ext: no edema, cyanosis           Data:       ROUTINE LABS (Last four results)  CMP  Recent Labs  Lab 09/27/17  0912 09/25/17  0902    139   POTASSIUM 3.7 4.0   CHLORIDE 103 104   CO2 26 27   ANIONGAP 9 8   GLC 81 93   BUN 9 5*   CR 0.56 0.52   GFRESTIMATED >90 >90   GFRESTBLACK >90 >90   JULIANA 9.1 9.0   PROTTOTAL  --  7.9   ALBUMIN  --  3.4   BILITOTAL  --  0.4   ALKPHOS  --  89   AST  --  16   ALT  --  17     CBC  Recent Labs  Lab 09/27/17  0912 09/25/17  0902   WBC 8.7 9.5   RBC 4.32 4.26   HGB 11.4* 11.1*   HCT 34.8* 35.3   MCV 81 83   MCH 26.4* 26.1*   MCHC 32.8 31.4*   RDW 14.8 14.9    347     Ferritin 62, , TB and pathology pending         Assessment and Plan:   IMPRESSION:   1.  Extensive mediastinal and cervical lymphadenopathy concerning for malignancy, tissue diagnosis is pending.     2.  Mild anemia with MCV on the lower side, no evidence of severe iron deficiency, could be associated suspected neoplasm.       P- Await biopsy prelim result, if non diagnostic will need excisional sample.    Addendum:  Discussed with pathology, biopsy shows sclerotic lymphoid tissue, immunophenotyping just sent and will need at least a day or 2 for results, there is no indication of TB on the biopsy, it is unsure if we need additional tissue depending on the immunophenotyping but excisional biopsy can be arranged later if needed.  Workup from oncology standpoint can continue as an outpatient and if discharged needs to arrange follow-up in 1 week in my office.          Niurka Diez M.D.

## 2017-09-27 NOTE — DISCHARGE SUMMARY
[facility]    Discharge Summary  Hospitalist    Date of Admission:  9/26/2017  Date of Discharge:  9/27/2017  Discharging Provider: Carey Garcia    Discharge Diagnoses   Massive cervical and mediastinal lymphadenopathy concerning for malignancy, with biopsy report pending at time of discharge    History of Present Illness   Liliam Galvan is a 32 year old female originally from [facility] with no significant PMHx who was admitted on 9/26/2017 from PCP's clinic for evaluation of progressive neck swelling with findings of massive cervical and mediastinal lymphadenopathy seen on outpatient CT.    Hospital Course   Liliam Galvan was admitted on 9/26/2017.  The following problems were addressed during her hospitalization:    Massive Cervical and Mediastinal Lymphadenopathy:  Patient noticed progressive neck swelling over the preceding two months. An US of the neck showed presence of lymphadenopathy. A CT of the neck was obtained and showed massive lymphadenopathy of the mediastinum and right neck  And mild lymphadenopathy of the supraclavicular left neck. She was admitted directly from clinic for further evaluation with ddx including lymphoma vs infectious etiology such as TB. CT chest/abd/pelvis was obtained and showed massive cervical, mediastinal and R hilar lymphadenopathy with some abnl reticulonodular opacities in the RML. Endorsed dry cough, no hemoptysis. No known TB exposure. Last traveled to [facility] in 2012.    Seen by multiple specialists this stay including ID, oncology and ENT. Quantiferon gold obtained in clinic on 9/25 was negative. HIV neg. Histoplasma and fungal Abs pending at time of discharge -- Dr. Thornton will follow results. Underwent LN biopsy on 9/26 -- Dr. Diez discussed with pathology on 9/27 -- biopsy showed sclerotic lymphoid tissue, additional testing pending but will takes few days to return, no evidence of TB on the biopsy -- can follow up in oncology clinic as  outpatient discuss results and plan for treatment. Thoracic surgery was also consulted given disease burden observed on CT -- no new recommendations from their standpoint, advised followup with oncology.     Patient discharged home in stable condition. Plan is to follow up with oncology in the clinic on 10/4 to discuss results of testing done this stay and determine definitive treatment plan. Patient and 's questions answered by providers (ID, onc, hospitalist) prior to discharge.      Carey Crowley Jose    Significant Results and Procedures   1. Lymph node biopsy done on 9/26/17 -- formal pathology report pending at the time of discharge    Pending Results   These results will be followed up by Dr. Diez (oncology) and Dr. Thornton (ID)  Unresulted Labs Ordered in the Past 30 Days of this Admission     Date and Time Order Name Status Description    9/26/2017 1503 Surgical pathology exam In process     9/26/2017 1443 Leukemia Lymphoma Evaluation (Flow Cytometry) In process     9/26/2017 1118 Histoplasma Capsulatum Agn Non Blood In process     9/26/2017 1118 Histoplasma capsulatum antigen In process     9/26/2017 1118 Fungal antibodies In process           Code Status   Full Code       Primary Care Physician   Rashard Lo    Physical Exam   Temp: 98.3  F (36.8  C) Temp src: Oral BP: 120/74 Pulse: 108   Resp: 16 SpO2: 100 % O2 Device: None (Room air)    Vitals:    09/26/17 1106   Weight: 71.2 kg (157 lb)     Vital Signs with Ranges  Temp:  [98.3  F (36.8  C)-99.3  F (37.4  C)] 98.3  F (36.8  C)  Pulse:  [] 108  Resp:  [16-18] 16  BP: ()/(58-84) 120/74  SpO2:  [95 %-100 %] 100 %  I/O last 3 completed shifts:  In: 710 [P.O.:710]  Out: -     General: Resting comfortably, alert, conversive, NAD  HEENT: +cervical lymphadenopathy  CVS: HRRR, no MGR, no LE edema  Respiratory: CTAB, no wheeze/rales/rhonchi  GI: S, NT, ND, +BS  Skin: Warm/dry    Discharge Disposition   Discharged to  home  Condition at discharge: Stable    Consultations This Hospital Stay   HEMATOLOGY & ONCOLOGY IP CONSULT  INFECTIOUS DISEASES IP CONSULT  ENT IP CONSULT    Time Spent on this Encounter   I, Carey Garcia, personally saw the patient today and spent greater than 30 minutes discharging this patient.    Discharge Orders     Reason for your hospital stay   Evaluation of your swollen lymph nodes, which was concerning for either an infection or lymphoma. A biopsy was done and initial pathology indicates you presumably have lymphoma.     Follow-up and recommended labs and tests    1. You will need to follow up with Dr. Diez ([facility]) in clinic in the next few days with to review your testing and determine an optimal treatment plan going forward.     Activity   Your activity upon discharge: activity as tolerated     Diet   Follow this diet upon discharge: Regular       Discharge Medications   Current Discharge Medication List      CONTINUE these medications which have NOT CHANGED    Details   IBUPROFEN PO Take by mouth as needed for moderate pain OTC dose unknown           Allergies   Allergies   Allergen Reactions     Nkda [No Known Drug Allergies]      Data   Most Recent 3 CBC's:  Recent Labs   Lab Test  09/27/17   0912  09/25/17   0902  07/03/17   0854   WBC  8.7  9.5  8.3   HGB  11.4*  11.1*  11.6*   MCV  81  83  84   PLT  313  347  304      Most Recent 3 BMP's:  Recent Labs   Lab Test  09/27/17   0912  09/25/17   0902  07/03/17   0854   NA  138  139  139   POTASSIUM  3.7  4.0  3.5   CHLORIDE  103  104  106   CO2  26  27  26   BUN  9  5*  9   CR  0.56  0.52  0.55   ANIONGAP  9  8  7   JULIANA  9.1  9.0  9.2   GLC  81  93  96     Most Recent 2 LFT's:  Recent Labs   Lab Test  09/25/17   0902  07/03/17   0854   AST  16  13   ALT  17  18   ALKPHOS  89  79   BILITOTAL  0.4  0.4     Results for orders placed or performed during the hospital encounter of 09/26/17   CT Chest/Abdomen/Pelvis w Contrast     Narrative    CT CHEST, ABDOMEN AND PELVIS WITH CONTRAST   9/26/2017 2:34 PM     HISTORY: Massive lymphadenopathy.    TECHNIQUE: 77 mL Isovue-370. CT images of the chest, abdomen, and  pelvis following oral and nonionic intravenous contrast. Radiation  dose for this scan was reduced using automated exposure control,  adjustment of the mA and/or kV according to patient size, or iterative  reconstruction technique.    COMPARISON: None.    FINDINGS:     Chest: There is marked bilateral cervical adenopathy, right greater  than left. Adenopathy causes severe compression of the right internal  jugular, left internal jugular, bilateral subclavian, and bilateral  brachiocephalic veins. Extensive mediastinal adenopathy is in the  prevascular space, around the great vessels, in the pretracheal and  subcarinal regions, and extends into the right pulmonary hilum. There  is no left hilar adenopathy. No axillary adenopathy.    There are innumerable tiny nodular opacities in the right middle lobe.  The remainder of the lungs are clear. No pleural or pericardial  effusion. Thyroid gland appears normal.    Abdomen: High density material in the gallbladder is likely excreted  contrast. The liver, spleen, pancreas, and adrenal glands appear  normal. There is no hydronephrosis or hydroureter. No solid renal  lesions. No abdominal or retroperitoneal adenopathy.    Pelvis: The uterus and adnexa are unremarkable. No pelvic free fluid.  No pelvic lymphadenopathy or mass.      Impression    IMPRESSION: Massive cervical, mediastinal, and right hilar  lymphadenopathy. There are also abnormal reticulonodular opacities in  the right middle lobe. Diagnostic possibilities include lymphoma and  tuberculosis. Other atypical infections not excluded.    ALE WASHINGTON MD   XR Chest 2 Views    Narrative    CHEST TWO VIEWS   9/26/2017 2:55 PM     HISTORY: Rule out TB. CT from prior day shows massive lymphadenopathy.    COMPARISON: 2/2/2009. Chest CT  9/26/2017.    FINDINGS: Again noted there is a mass along the right side of the  mediastinum, right hilum and to a lesser degree the left superior  mediastinum, consistent with massive lymphadenopathy seen on recent  CT. Reticulonodular opacities are seen throughout the right middle  lobe. No pneumothorax or pleural effusion. No acute osseous  abnormality.      Impression    IMPRESSION: Mediastinal and right hilar mass, consistent with  adenopathy on recent CT. Reticulonodular opacities are also seen in  the right middle lobe. Differential includes lymphoma or tuberculosis.  Other atypical infections are not excluded.   US Biopsy Lymph Node Core    Narrative    ULTRASOUND BIOPSY CORE LYMPH NODE  9/26/2017 3:20 PM     HISTORY: Right cervical lymphadenopathy.    COMPARISON: None.    MEDICATIONS: 5 mL 1% lidocaine.     TECHNIQUE: Informed consent was obtained from the patient. A timeout  was performed. The skin was prepped and draped in a sterile manner. 1%  lidocaine was used for local anesthesia. Under ultrasound guidance, a  17-gauge access needle was advanced into an abnormally enlarged right  cervical lymph node. Through this needle, multiple 18-gauge core  biopsy specimens were obtained and submitted to pathology. The patient  tolerated the procedure well with no immediate complications.    FINDINGS: Limited ultrasound images demonstrate needle access to an  enlarged hypoechoic lymph node in the right neck.      Impression    IMPRESSION: Ultrasound-guided biopsy of a right cervical lymph node.    ALE WASHINGTON MD

## 2017-09-27 NOTE — DISCHARGE INSTRUCTIONS
Appointment Wednesday October 4th, Check in 2:10pm with Dr. Diez.  [address]  [facility] #[phone] Dr. Diez-follow up with final biopsy results.

## 2017-09-28 LAB — COPATH REPORT: NORMAL

## 2017-09-29 ENCOUNTER — TELEPHONE (OUTPATIENT)
Dept: INTERNAL MEDICINE | Facility: CLINIC | Age: 32
End: 2017-09-29

## 2017-09-29 DIAGNOSIS — R21 RASH OF NECK: ICD-10-CM

## 2017-09-29 LAB — COPATH REPORT: NORMAL

## 2017-09-29 NOTE — TELEPHONE ENCOUNTER
Patient crying, anxious, unable to sleep.  Quite anxious for all lab results to come back, especially pathology report from lymph node biopsy.  Wants to be notified of results as soon as available.  BENY Macias R.N.

## 2017-09-30 NOTE — TELEPHONE ENCOUNTER
hydrocortisone (CORTAID) 1 % cream   (Discontinued)   Last Written Prescription Date:  7/7/2017  Last Fill Quantity: 30g,   # refills: 0  Last Office Visit with [facility], [facility] or [facility] prescribing provider: 9/26/2017  Future Office visit:    Next 5 appointments (look out 90 days)     Oct 03, 2017  5:20 PM CDT   SHORT with Pat Monroe MD   [facility] ([facility])    [address]  [facility] [address] [phone]

## 2017-10-01 LAB
LAB SCANNED RESULT: NORMAL
LAB SCANNED RESULT: NORMAL

## 2017-10-02 ENCOUNTER — TRANSFERRED RECORDS (OUTPATIENT)
Dept: HEALTH INFORMATION MANAGEMENT | Facility: CLINIC | Age: 32
End: 2017-10-02

## 2017-10-02 RX ORDER — BENZOCAINE/MENTHOL 6 MG-10 MG
LOZENGE MUCOUS MEMBRANE
Qty: 28.35 G | Refills: 0 | Status: ON HOLD | OUTPATIENT
Start: 2017-10-02 | End: 2018-11-01

## 2017-10-02 NOTE — TELEPHONE ENCOUNTER
Hydrocortisone cream last refilled 7/7/17 and OV notes indicate would be used for short term. Routing to provider for review.

## 2017-10-03 NOTE — H&P (VIEW-ONLY)
[facility]    History and Physical  Hospitalist       Date of Admission:  9/26/2017  Date of Service (when I saw the patient): 09/26/17    Assessment & Plan   Liliam Galvan is a 32 year old female with no significant past medical history who presents with massive cervical and mediastinal lymphadenopathy.  She has been admitted to the hospital for further workup.    Massive cervical and mediastinal lymphadenopathy.  This is as seen on her CT of the neck.  She also has intermittent low-grade fevers, voice hoarseness, and mild cough.  No known TB exposure although she is from [facility], with last travel there in 2012.  Concern also for possibly for lymphoma.  CBC is unremarkable other than mild anemia.  Currently afebrile.  - Infectious disease and heme/onc consults ordered  - CT chest to rule out active pulmonary disease.   - Airborne isolation with negative airflow.  - Sputum for AFB if CT chest positive and is able to given any sputum.  QuantiFERON is pending.  - CT abdomen and pelvis to evaluate for any extension of lymphadenopathy/malignancy  - ENT consulted for FNA of the right neck LN.   - CT surgery consult given amount of mediastinal LN involvement.   - Fungal serologies and HIV ordered for completeness   - Hold off starting any antimicrobials for now.     DVT Prophylaxis: Pneumatic Compression Devices  Code Status: Full Code    Disposition: Expected discharge pending and initial workup results and input from consultants.    Zay Nye    Primary Care Physician   Dr. Amrik Weaver    Chief Complaint   Swollen cervical lymph nodes    History is obtained from the patient    History of Present Illness   Liliam Galvan is a 32 year old female with no significant past medical history who presented to clinic with complaints of neck swelling.  Patient has noticed Swelling over the last two months.  Initially she noticed a rash on her neck, which was felt to be from a scarf.  The rash resolved  but the swelling continued.  An ultrasound of her neck was done which showed lymphadenopathy.  A follow-up CT scan of the neck was completed yesterday  which shows massive lymphadenopathy of the mediastinum and right neck up to the level of the hyoid bone as well as mild lymphadenopathy in the supraclavicular left neck.  There is mass effect with leftward deviation of the aerodigestive tract from the larynx and upper thoracic trachea without significant narrowing of the airway.  Patient was seen in clinic to discuss the test results and was subsequently directly admitted to [facility] for further workup.    Patient does note subjective intermittent low-grade fevers although has not measured this at home.  She has a mild cough and some hoarseness in the morning time.  She denies any chills or night sweats.  She had an intentional approximately 3 pound weight loss over the last couple months.  She does report some decreased appetite.  No shortness of breath or chest pain.  She does have some neck discomfort.  No lightheadedness, syncope, abdominal pain, bloating, nausea/vomiting, diarrhea, dysuria, no weakness, numbness or tingling.  No known tuberculosis exposures.  She emigrated here from [facility] approximately nine years ago, with her last return visit being in .    Past Medical History    Patient denies any past medical history    Past Surgical History   Patient denies any previous surgeries    Prior to Admission Medications   Prior to Admission Medications   Prescriptions Last Dose Informant Patient Reported? Taking?   Misc. Devices (BREAST PUMP) MISC   No No   Si each as needed   hydrocortisone (CORTAID) 1 % cream   No No   Sig: Apply sparingly to affected area three times daily for 14 days.   permethrin (ELIMITE) 5 % cream   No No   Sig: Apply cream from head to toe (except the face); leave on for 8-14 hours then wash off with water;   valACYclovir (VALTREX) 1000 mg tablet   No No   Sig: Take 1 tablet (1,000  mg) by mouth 3 times daily      Facility-Administered Medications: None     Allergies   Allergies   Allergen Reactions     Nkda [No Known Drug Allergies]        Social History   Patient emigrated here nine years ago from [place].  She works as a .  She denies any history of tobacco use.  She does not drink alcohol.    Family History   Patient reports both of her parents are living and in good health.  She denies any family history of lymphoma, or cancers.  No family history of tuberculosis.    Review of Systems   The 10 point Review of Systems is negative other than noted in the HPI.    Physical Exam   Temp: 99.1  F (37.3  C) Temp src: Oral BP: 123/83 Pulse: 115   Resp: 18 SpO2: 100 % O2 Device: None (Room air)    Vital Signs with Ranges  Temp:  [97.9  F (36.6  C)-99.1  F (37.3  C)] 99.1  F (37.3  C)  Pulse:  [115-129] 115  Resp:  [18] 18  BP: (110-123)/(70-83) 123/83  SpO2:  [100 %] 100 %  157 lbs 0 oz    GENERAL: Alert, oriented to person, place, date. Cooperative and lying in bed in no acute distress.   EYES: Pupils equal, round. Extraocular movements intact.  HEENT:  Normocephalic, Mucous membranes moist. No tonsillar exudate or erythema.   NECK: Supple.  Large, firm cervical lymphadenopathy, right greater than left.  Some bilateral supraclavicular lymphadenopathy as well.  CARDIOVASCULAR: Regular rate and rhythm without murmurs, rubs, or gallops.   PULMONARY: Breath sounds clear, without crackles, wheezes, or rhonchi bilaterally. No accessory muscle use.   GASTROINTESTINAL: Soft and non-distended. Normoactive bowel sounds. Nontender in all 4 quadrants.  No masses.  MUSCULOSKELETAL: Strength 5/5 in upper and lower extremeties bilaterally.   SKIN: Warm, dry, no rashes.  No rashes appreciated on limited exam.  EXTREMITIES: Pulses 2+ in upper and lower extremities bilaterally. No lower extremity edema.    NEUROLOGIC: Cranial nerves II-XII intact.  Motor function grossly intact and equal in upper and  lower extremities bilaterally.   PSYCHIATRIC: Normal mood and affect.     Data   Data reviewed today:  I personally reviewed all labs and imaging results.    Recent Labs  Lab 09/25/17  0902   WBC 9.5   HGB 11.1*   MCV 83         POTASSIUM 4.0   CHLORIDE 104   CO2 27   BUN 5*   CR 0.52   ANIONGAP 8   JULIANA 9.0   GLC 93   ALBUMIN 3.4   PROTTOTAL 7.9   BILITOTAL 0.4   ALKPHOS 89   ALT 17   AST 16       Recent Results (from the past 24 hour(s))   CT Soft Tissue Neck w Contrast    Narrative    CT OF THE NECK WITH CONTRAST  9/25/2017 4:58 PM     COMPARISON: None.    HISTORY: Localized swelling, mass and lump, neck    TECHNIQUE:  Axial CT images of the neck were acquired after the  intravenous administration of 80mL Isovue-370 nonionic iodinated  contrast material. Coronal reconstructions were created.    FINDINGS: There is massive lymphadenopathy throughout the mediastinum.  There is mild lymphadenopathy in the supraclavicular left neck. There  is massive conglomerate lymphadenopathy in the supraclavicular right  neck with marked lymphadenopathy in the right neck extending up to the  level of the hyoid bone. There is no suprahyoid lymphadenopathy in  either side of the neck. There is extensive fat stranding intermingled  among the lymph nodes in the right neck suggesting an inflammatory  process. A few of the lymph nodes in the right neck demonstrate  low-density suggesting necrosis. While lymphatic malignancy must  certainly be considered, an infectious etiology suggest tuberculosis  must also be considered. Clinical correlation recommended.    There are no fluid collections or abscesses anyone the neck. The  salivary glands are unremarkable.    Although the thyroid gland itself is normal in contour and density,  there is marked leftward shift of the aerodigestive tract from the  level of the larynx down to the upper thoracic trachea. Despite this  midline shift, the airway from the nasopharynx to the cynthia  remains  widely patent.    There is no sinusitis or mastoiditis. The visualized bones are  unremarkable. The lung apices are clear.      Impression    IMPRESSION:  1. Massive lymphadenopathy of the mediastinum and right neck up to the  level of the hyoid bone as well as mild lymphadenopathy in the  supraclavicular left neck. While lymphatic malignancy must certainly  be considered, an infectious etiology such as tuberculosis is also  quite possible.  2. Mass effect with leftward deviation of the aerodigestive tract from  the larynx down to the upper thoracic trachea without significant  narrowing of the airway.  3. Otherwise, normal soft tissue neck CT.      Radiation dose for this scan was reduced using automated exposure  control, adjustment of the mA and/or kV according to patient size, or  iterative reconstruction technique    ALEC GREENE MD

## 2017-10-06 ENCOUNTER — ANESTHESIA (OUTPATIENT)
Dept: SURGERY | Facility: CLINIC | Age: 32
End: 2017-10-06
Payer: COMMERCIAL

## 2017-10-06 ENCOUNTER — ANESTHESIA EVENT (OUTPATIENT)
Dept: SURGERY | Facility: CLINIC | Age: 32
End: 2017-10-06
Payer: COMMERCIAL

## 2017-10-06 ENCOUNTER — HOSPITAL ENCOUNTER (OUTPATIENT)
Facility: CLINIC | Age: 32
Discharge: HOME | End: 2017-10-06
Attending: THORACIC SURGERY (CARDIOTHORACIC VASCULAR SURGERY) | Admitting: THORACIC SURGERY (CARDIOTHORACIC VASCULAR SURGERY)
Payer: COMMERCIAL

## 2017-10-06 ENCOUNTER — SURGERY (OUTPATIENT)
Age: 32
End: 2017-10-06

## 2017-10-06 VITALS
TEMPERATURE: 98.4 F | WEIGHT: 155.38 LBS | DIASTOLIC BLOOD PRESSURE: 71 MMHG | RESPIRATION RATE: 16 BRPM | SYSTOLIC BLOOD PRESSURE: 111 MMHG | BODY MASS INDEX: 28.59 KG/M2 | OXYGEN SATURATION: 100 % | HEIGHT: 62 IN

## 2017-10-06 DIAGNOSIS — R22.1 MASS OF RIGHT SIDE OF NECK: Primary | ICD-10-CM

## 2017-10-06 LAB
ASPERGILLUS AB TITR SER CF: NORMAL {TITER}
B DERMAT AB TITR SER CF: NORMAL {TITER}
COCCIDIOIDES AB TITR SER CF: NORMAL {TITER}
H CAPSUL MYC AB TITR SER CF: NORMAL {TITER}
H CAPSUL YST AB TITR SER CF: NORMAL {TITER}
HCG UR QL: NEGATIVE

## 2017-10-06 PROCEDURE — 25000128 H RX IP 250 OP 636: Performed by: ANESTHESIOLOGY

## 2017-10-06 PROCEDURE — 88305 TISSUE EXAM BY PATHOLOGIST: CPT | Mod: 26 | Performed by: THORACIC SURGERY (CARDIOTHORACIC VASCULAR SURGERY)

## 2017-10-06 PROCEDURE — 88341 IMHCHEM/IMCYTCHM EA ADD ANTB: CPT | Mod: 26,59 | Performed by: THORACIC SURGERY (CARDIOTHORACIC VASCULAR SURGERY)

## 2017-10-06 PROCEDURE — 37000008 ZZH ANESTHESIA TECHNICAL FEE, 1ST 30 MIN: Performed by: THORACIC SURGERY (CARDIOTHORACIC VASCULAR SURGERY)

## 2017-10-06 PROCEDURE — 25000566 ZZH SEVOFLURANE, EA 15 MIN: Performed by: THORACIC SURGERY (CARDIOTHORACIC VASCULAR SURGERY)

## 2017-10-06 PROCEDURE — 88342 IMHCHEM/IMCYTCHM 1ST ANTB: CPT | Mod: 26 | Performed by: THORACIC SURGERY (CARDIOTHORACIC VASCULAR SURGERY)

## 2017-10-06 PROCEDURE — 25000132 ZZH RX MED GY IP 250 OP 250 PS 637: Performed by: PHYSICIAN ASSISTANT

## 2017-10-06 PROCEDURE — 71000013 ZZH RECOVERY PHASE 1 LEVEL 1 EA ADDTL HR: Performed by: THORACIC SURGERY (CARDIOTHORACIC VASCULAR SURGERY)

## 2017-10-06 PROCEDURE — C9399 UNCLASSIFIED DRUGS OR BIOLOG: HCPCS | Performed by: NURSE ANESTHETIST, CERTIFIED REGISTERED

## 2017-10-06 PROCEDURE — 37000009 ZZH ANESTHESIA TECHNICAL FEE, EACH ADDTL 15 MIN: Performed by: THORACIC SURGERY (CARDIOTHORACIC VASCULAR SURGERY)

## 2017-10-06 PROCEDURE — 88341 IMHCHEM/IMCYTCHM EA ADD ANTB: CPT | Performed by: THORACIC SURGERY (CARDIOTHORACIC VASCULAR SURGERY)

## 2017-10-06 PROCEDURE — 71000012 ZZH RECOVERY PHASE 1 LEVEL 1 FIRST HR: Performed by: THORACIC SURGERY (CARDIOTHORACIC VASCULAR SURGERY)

## 2017-10-06 PROCEDURE — 40000170 ZZH STATISTIC PRE-PROCEDURE ASSESSMENT II: Performed by: THORACIC SURGERY (CARDIOTHORACIC VASCULAR SURGERY)

## 2017-10-06 PROCEDURE — 88342 IMHCHEM/IMCYTCHM 1ST ANTB: CPT | Performed by: THORACIC SURGERY (CARDIOTHORACIC VASCULAR SURGERY)

## 2017-10-06 PROCEDURE — 81025 URINE PREGNANCY TEST: CPT | Performed by: ANESTHESIOLOGY

## 2017-10-06 PROCEDURE — 36000093 ZZH SURGERY LEVEL 4 1ST 30 MIN: Performed by: THORACIC SURGERY (CARDIOTHORACIC VASCULAR SURGERY)

## 2017-10-06 PROCEDURE — 25000125 ZZHC RX 250: Performed by: NURSE ANESTHETIST, CERTIFIED REGISTERED

## 2017-10-06 PROCEDURE — 25000128 H RX IP 250 OP 636: Performed by: THORACIC SURGERY (CARDIOTHORACIC VASCULAR SURGERY)

## 2017-10-06 PROCEDURE — 88161 CYTOPATH SMEAR OTHER SOURCE: CPT | Mod: 91 | Performed by: THORACIC SURGERY (CARDIOTHORACIC VASCULAR SURGERY)

## 2017-10-06 PROCEDURE — 71000027 ZZH RECOVERY PHASE 2 EACH 15 MINS: Performed by: THORACIC SURGERY (CARDIOTHORACIC VASCULAR SURGERY)

## 2017-10-06 PROCEDURE — 25000128 H RX IP 250 OP 636: Performed by: NURSE ANESTHETIST, CERTIFIED REGISTERED

## 2017-10-06 PROCEDURE — 36000063 ZZH SURGERY LEVEL 4 EA 15 ADDTL MIN: Performed by: THORACIC SURGERY (CARDIOTHORACIC VASCULAR SURGERY)

## 2017-10-06 PROCEDURE — 27210794 ZZH OR GENERAL SUPPLY STERILE: Performed by: THORACIC SURGERY (CARDIOTHORACIC VASCULAR SURGERY)

## 2017-10-06 PROCEDURE — 27210995 ZZH RX 272: Performed by: THORACIC SURGERY (CARDIOTHORACIC VASCULAR SURGERY)

## 2017-10-06 PROCEDURE — 88161 CYTOPATH SMEAR OTHER SOURCE: CPT | Mod: 26 | Performed by: THORACIC SURGERY (CARDIOTHORACIC VASCULAR SURGERY)

## 2017-10-06 PROCEDURE — 88305 TISSUE EXAM BY PATHOLOGIST: CPT | Performed by: THORACIC SURGERY (CARDIOTHORACIC VASCULAR SURGERY)

## 2017-10-06 RX ORDER — CEFAZOLIN SODIUM 1 G/3ML
1 INJECTION, POWDER, FOR SOLUTION INTRAMUSCULAR; INTRAVENOUS SEE ADMIN INSTRUCTIONS
Status: DISCONTINUED | OUTPATIENT
Start: 2017-10-06 | End: 2017-10-06 | Stop reason: HOSPADM

## 2017-10-06 RX ORDER — NALOXONE HYDROCHLORIDE 0.4 MG/ML
.1-.4 INJECTION, SOLUTION INTRAMUSCULAR; INTRAVENOUS; SUBCUTANEOUS
Status: DISCONTINUED | OUTPATIENT
Start: 2017-10-06 | End: 2017-10-06 | Stop reason: HOSPADM

## 2017-10-06 RX ORDER — DEXAMETHASONE SODIUM PHOSPHATE 4 MG/ML
INJECTION, SOLUTION INTRA-ARTICULAR; INTRALESIONAL; INTRAMUSCULAR; INTRAVENOUS; SOFT TISSUE PRN
Status: DISCONTINUED | OUTPATIENT
Start: 2017-10-06 | End: 2017-10-06

## 2017-10-06 RX ORDER — MEPERIDINE HYDROCHLORIDE 25 MG/ML
12.5 INJECTION INTRAMUSCULAR; INTRAVENOUS; SUBCUTANEOUS
Status: DISCONTINUED | OUTPATIENT
Start: 2017-10-06 | End: 2017-10-06 | Stop reason: HOSPADM

## 2017-10-06 RX ORDER — OMEGA-3 FATTY ACIDS/FISH OIL 300-1000MG
600 CAPSULE ORAL EVERY 4 HOURS PRN
Qty: 120 CAPSULE
Start: 2017-10-06 | End: 2019-09-27

## 2017-10-06 RX ORDER — IBUPROFEN 600 MG/1
600 TABLET, FILM COATED ORAL
Status: DISCONTINUED | OUTPATIENT
Start: 2017-10-06 | End: 2017-10-06 | Stop reason: HOSPADM

## 2017-10-06 RX ORDER — PROPOFOL 10 MG/ML
INJECTION, EMULSION INTRAVENOUS PRN
Status: DISCONTINUED | OUTPATIENT
Start: 2017-10-06 | End: 2017-10-06

## 2017-10-06 RX ORDER — SODIUM CHLORIDE, SODIUM LACTATE, POTASSIUM CHLORIDE, CALCIUM CHLORIDE 600; 310; 30; 20 MG/100ML; MG/100ML; MG/100ML; MG/100ML
INJECTION, SOLUTION INTRAVENOUS CONTINUOUS
Status: DISCONTINUED | OUTPATIENT
Start: 2017-10-06 | End: 2017-10-06 | Stop reason: HOSPADM

## 2017-10-06 RX ORDER — FENTANYL CITRATE 50 UG/ML
INJECTION, SOLUTION INTRAMUSCULAR; INTRAVENOUS PRN
Status: DISCONTINUED | OUTPATIENT
Start: 2017-10-06 | End: 2017-10-06

## 2017-10-06 RX ORDER — HYDROMORPHONE HYDROCHLORIDE 1 MG/ML
.3-.5 INJECTION, SOLUTION INTRAMUSCULAR; INTRAVENOUS; SUBCUTANEOUS EVERY 10 MIN PRN
Status: DISCONTINUED | OUTPATIENT
Start: 2017-10-06 | End: 2017-10-06 | Stop reason: HOSPADM

## 2017-10-06 RX ORDER — ONDANSETRON 2 MG/ML
INJECTION INTRAMUSCULAR; INTRAVENOUS PRN
Status: DISCONTINUED | OUTPATIENT
Start: 2017-10-06 | End: 2017-10-06

## 2017-10-06 RX ORDER — HYDROCODONE BITARTRATE AND ACETAMINOPHEN 5; 325 MG/1; MG/1
1 TABLET ORAL EVERY 6 HOURS PRN
Qty: 6 TABLET | Refills: 0 | Status: ON HOLD | OUTPATIENT
Start: 2017-10-06 | End: 2018-11-01

## 2017-10-06 RX ORDER — FENTANYL CITRATE 50 UG/ML
25-50 INJECTION, SOLUTION INTRAMUSCULAR; INTRAVENOUS
Status: DISCONTINUED | OUTPATIENT
Start: 2017-10-06 | End: 2017-10-06 | Stop reason: HOSPADM

## 2017-10-06 RX ORDER — SODIUM CHLORIDE, SODIUM LACTATE, POTASSIUM CHLORIDE, CALCIUM CHLORIDE 600; 310; 30; 20 MG/100ML; MG/100ML; MG/100ML; MG/100ML
INJECTION, SOLUTION INTRAVENOUS CONTINUOUS PRN
Status: DISCONTINUED | OUTPATIENT
Start: 2017-10-06 | End: 2017-10-06

## 2017-10-06 RX ORDER — ONDANSETRON 4 MG/1
4 TABLET, ORALLY DISINTEGRATING ORAL EVERY 30 MIN PRN
Status: DISCONTINUED | OUTPATIENT
Start: 2017-10-06 | End: 2017-10-06 | Stop reason: HOSPADM

## 2017-10-06 RX ORDER — ONDANSETRON 2 MG/ML
4 INJECTION INTRAMUSCULAR; INTRAVENOUS EVERY 30 MIN PRN
Status: DISCONTINUED | OUTPATIENT
Start: 2017-10-06 | End: 2017-10-06 | Stop reason: HOSPADM

## 2017-10-06 RX ORDER — FENTANYL CITRATE 50 UG/ML
25-50 INJECTION, SOLUTION INTRAMUSCULAR; INTRAVENOUS EVERY 5 MIN PRN
Status: DISCONTINUED | OUTPATIENT
Start: 2017-10-06 | End: 2017-10-06 | Stop reason: HOSPADM

## 2017-10-06 RX ORDER — MAGNESIUM HYDROXIDE 1200 MG/15ML
LIQUID ORAL PRN
Status: DISCONTINUED | OUTPATIENT
Start: 2017-10-06 | End: 2017-10-06 | Stop reason: HOSPADM

## 2017-10-06 RX ORDER — HYDROCODONE BITARTRATE AND ACETAMINOPHEN 5; 325 MG/1; MG/1
1-2 TABLET ORAL
Status: COMPLETED | OUTPATIENT
Start: 2017-10-06 | End: 2017-10-06

## 2017-10-06 RX ORDER — LIDOCAINE HYDROCHLORIDE 20 MG/ML
INJECTION, SOLUTION INFILTRATION; PERINEURAL PRN
Status: DISCONTINUED | OUTPATIENT
Start: 2017-10-06 | End: 2017-10-06

## 2017-10-06 RX ORDER — CEFAZOLIN SODIUM 2 G/100ML
2 INJECTION, SOLUTION INTRAVENOUS
Status: COMPLETED | OUTPATIENT
Start: 2017-10-06 | End: 2017-10-06

## 2017-10-06 RX ADMIN — MIDAZOLAM HYDROCHLORIDE 2 MG: 1 INJECTION, SOLUTION INTRAMUSCULAR; INTRAVENOUS at 11:20

## 2017-10-06 RX ADMIN — PROPOFOL 20 MG: 10 INJECTION, EMULSION INTRAVENOUS at 11:58

## 2017-10-06 RX ADMIN — FENTANYL CITRATE 25 MCG: 50 INJECTION, SOLUTION INTRAMUSCULAR; INTRAVENOUS at 12:12

## 2017-10-06 RX ADMIN — SODIUM CHLORIDE, POTASSIUM CHLORIDE, SODIUM LACTATE AND CALCIUM CHLORIDE: 600; 310; 30; 20 INJECTION, SOLUTION INTRAVENOUS at 11:19

## 2017-10-06 RX ADMIN — FENTANYL CITRATE 25 MCG: 50 INJECTION, SOLUTION INTRAMUSCULAR; INTRAVENOUS at 11:59

## 2017-10-06 RX ADMIN — CEFAZOLIN SODIUM 2 G: 2 INJECTION, SOLUTION INTRAVENOUS at 11:30

## 2017-10-06 RX ADMIN — DEXAMETHASONE SODIUM PHOSPHATE 4 MG: 4 INJECTION, SOLUTION INTRA-ARTICULAR; INTRALESIONAL; INTRAMUSCULAR; INTRAVENOUS; SOFT TISSUE at 11:36

## 2017-10-06 RX ADMIN — ROCURONIUM BROMIDE 40 MG: 10 INJECTION INTRAVENOUS at 11:24

## 2017-10-06 RX ADMIN — LIDOCAINE HYDROCHLORIDE 80 MG: 20 INJECTION, SOLUTION INFILTRATION; PERINEURAL at 11:23

## 2017-10-06 RX ADMIN — ONDANSETRON 4 MG: 2 INJECTION INTRAMUSCULAR; INTRAVENOUS at 11:56

## 2017-10-06 RX ADMIN — FENTANYL CITRATE 25 MCG: 50 INJECTION, SOLUTION INTRAMUSCULAR; INTRAVENOUS at 11:46

## 2017-10-06 RX ADMIN — FENTANYL CITRATE 50 MCG: 50 INJECTION, SOLUTION INTRAMUSCULAR; INTRAVENOUS at 12:22

## 2017-10-06 RX ADMIN — SUGAMMADEX 150 MG: 100 INJECTION, SOLUTION INTRAVENOUS at 11:58

## 2017-10-06 RX ADMIN — SODIUM CHLORIDE 1000 ML: 0.9 IRRIGANT IRRIGATION at 11:39

## 2017-10-06 RX ADMIN — PROPOFOL 150 MG: 10 INJECTION, EMULSION INTRAVENOUS at 11:23

## 2017-10-06 RX ADMIN — HYDROCODONE BITARTRATE AND ACETAMINOPHEN 1 TABLET: 5; 325 TABLET ORAL at 13:07

## 2017-10-06 RX ADMIN — FENTANYL CITRATE 50 MCG: 50 INJECTION, SOLUTION INTRAMUSCULAR; INTRAVENOUS at 12:45

## 2017-10-06 RX ADMIN — FENTANYL CITRATE 25 MCG: 50 INJECTION, SOLUTION INTRAMUSCULAR; INTRAVENOUS at 11:23

## 2017-10-06 NOTE — ANESTHESIA PREPROCEDURE EVALUATION
Anesthesia Evaluation     .             ROS/MED HX    ENT/Pulmonary:     (+)other ENT- cough; hoarseness;, , . .   (-) sleep apnea   Neurologic:       Cardiovascular:         METS/Exercise Tolerance:     Hematologic:         Musculoskeletal:         GI/Hepatic:        (-) GERD   Renal/Genitourinary:         Endo:         Psychiatric:         Infectious Disease:         Malignancy:         Other:                     Physical Exam  Normal systems: cardiovascular, pulmonary and dental    Airway   Mallampati: II  TM distance: >3 FB  Neck ROM: full    Dental     Cardiovascular       Pulmonary     Other findings: Soft neck/clavicular mass on right;                Anesthesia Plan      History & Physical Review  History and physical reviewed and following examination; no interval change.    ASA Status:  2 .    NPO Status:  > 8 hours    Plan for General and ETT with Intravenous induction. Maintenance will be Balanced.    PONV prophylaxis:  Ondansetron (or other 5HT-3)  Additional equipment: Videolaryngoscope and Fiberoptic bronchoscope      Postoperative Care  Postoperative pain management:  IV analgesics.      Consents  Anesthetic plan, risks, benefits and alternatives discussed with:  Patient..                          .

## 2017-10-06 NOTE — BRIEF OP NOTE
[facility] Brief Operative Note    Pre-operative diagnosis: RIGHT NECK MASS   Post-operative diagnosis right cervical mass     Procedure: Procedure(s):  BIOPSY RIGHT NECK MASS; EXCISION OF RIGHT CERVICAL LYMPH NODES - Wound Class: I-Clean   Surgeon(s): Surgeon(s) and Role:     * Roberto Jonas MD - Primary     * Whitney Bloom PA-C - Assisting   Estimated blood loss: 1 cc   Specimens:   ID Type Source Tests Collected by Time Destination   A : Right Cervical Lymph Node Tissue Lymph Node, Anterior Cervical SURGICAL PATHOLOGY EXAM Roberto Jonas MD 10/6/2017 11:45 AM    B : Right Cervical Lymph Node Tissue Lymph Node SURGICAL PATHOLOGY EXAM Roberto Jonas MD 10/6/2017 11:51 AM       Findings: Large firm nodular mass in right cervical area- sent fresh for lymphoma protocol

## 2017-10-06 NOTE — IP AVS SNAPSHOT
MRN:[id number]                      After Visit Summary   10/6/2017    Liliam Galvan    MRN: [id number]           Thank you!     Thank you for choosing [facility] for your care. Our goal is always to provide you with excellent care. Hearing back from our patients is one way we can continue to improve our services. Please take a few minutes to complete the written survey that you may receive in the mail after you visit with us. Thank you!        Patient Information     Date Of Birth          1985        About your hospital stay     You were admitted on:  October 6, 2017 You last received care in the:  [facility] PACU    You were discharged on:  October 6, 2017       Who to Call     For medical emergencies, please call 911.  For non-urgent questions about your medical care, please call your primary care provider or clinic, [phone]  For questions related to your surgery, please call your surgery clinic        Attending Provider     Provider Roberto Bond MD Thoracic Diseases       Primary Care Provider Office Phone # Fax #    [facility] [phone]      After Care Instructions     Diet Instructions       Resume pre-procedure diet            Discharge Instructions       Follow up appointment as instructed by Surgeon and or RN--    Dr. Jonas' office (he or his nurse) will call you with the final pathology            Do not - immerse incision in water until sutures removed       Do not immerse incision in water/swim for one week.            Dressing       Keep clear Dermabond glue in place.  It will peel off on its own after about 7-10 days.            Shower       No shower for 24 hours post procedure. May shower Postoperative Day (POD)  one                  Further instructions from your care team       Same Day Surgery Discharge Instructions for  Sedation and General Anesthesia       It's not unusual to feel dizzy, light-headed  or faint for up to 24 hours after surgery or while taking pain medication.  If you have these symptoms: sit for a few minutes before standing and have someone assist you when you get up to walk or use the bathroom.      You should rest and relax for the next 24 hours. We recommend you make arrangements to have an adult stay with you for at least 24 hours after your discharge.  Avoid hazardous and strenuous activity.      DO NOT DRIVE any vehicle or operate mechanical equipment for 24 hours following the end of your surgery.  Even though you may feel normal, your reactions may be affected by the medication you have received.      Do not drink alcoholic beverages for 24 hours following surgery.       Slowly progress to your regular diet as you feel able. It's not unusual to feel nauseated and/or vomit after receiving anesthesia.  If you develop these symptoms, drink clear liquids (apple juice, ginger ale, broth, 7-up, etc. ) until you feel better.  If your nausea and vomiting persists for 24 hours, please notify your surgeon.        All narcotic pain medications, along with inactivity and anesthesia, can cause constipation. Drinking plenty of liquids and increasing fiber intake will help.      For any questions of a medical nature, call your surgeon.      Do not make important decisions for 24 hours.      If you had general anesthesia, you may have a sore throat for a couple of days related to the breathing tube used during surgery.  You may use Cepacol lozenges to help with this discomfort.  If it worsens or if you develop a fever, contact your surgeon.       If you feel your pain is not well managed with the pain medications prescribed by your surgeon, please contact your surgeon's office to let them know so they can address your concerns.       Reasons to contact your surgeon:    1. Signs of possible infection: Check your incision daily for redness, swelling, warmth, red streaks or foul drainage.   2. Elevated  "temperature.  3. Pain not controlled with pain medication and/or rest.   4. Uncontrolled nausea or vomiting.  5. Any questions or concerns.      Additional Information     If you use hormonal birth control (such as the pill, patch, ring or implants): You'll need a second form of birth control for 7 days (condoms, a diaphragm or contraceptive foam). While in the hospital, you received a medicine called Bridion. Your normal birth control will not work as well for a week after taking this medicine.          Pending Results     Date and Time Order Name Status Description    10/6/2017 1145 Surgical pathology exam In process             Admission Information     Date & Time Provider Department Dept. Phone    10/6/2017 Roberto Jonas MD [facility] PACU [phone]      Your Vitals Were     Blood Pressure Temperature Respirations Height Weight Last Period    114/77 98.4  F (36.9  C) 16 1.575 m (5' 2\") 70.5 kg (155 lb 6 oz) 09/18/2017    Pulse Oximetry BMI (Body Mass Index)                94% 28.42 kg/m2          [facility] Information     [facility] gives you secure access to your electronic health record. If you see a primary care provider, you can also send messages to your care team and make appointments. If you have questions, please call your primary care clinic.  If you do not have a primary care provider, please call [phone] and they will assist you.        Care EveryWhere ID     This is your Care EveryWhere ID. This could be used by other organizations to access your [facility] medical records  [id number]        Equal Access to Services     PALOMO RIVERO : Hadii steve benitez Somerle, waaxda luqadaha, qaybta kaalmada annabelle taylor . So [facility] [phone].    ATENCIÓN: Si habla español, tiene a arizmendi disposición servicios gratuitos de asistencia lingüística. Llame al [phone].    We comply with applicable federal civil rights laws and [place] laws. We do not " discriminate on the basis of race, color, national origin, age, disability, sex, sexual orientation, or gender identity.               Review of your medicines      START taking        Dose / Directions    HYDROcodone-acetaminophen 5-325 MG per tablet   Commonly known as:  NORCO   Used for:  Mass of right side of neck   Notes to Patient:  One pain pill taken at 1:10 pm.        Dose:  1 tablet   Take 1 tablet by mouth every 6 hours as needed for other (Moderate to Severe Pain)   Quantity:  6 tablet   Refills:  0         CONTINUE these medicines which may have CHANGED, or have new prescriptions. If we are uncertain of the size of tablets/capsules you have at home, strength may be listed as something that might have changed.        Dose / Directions    ibuprofen 200 MG capsule   This may have changed:    - medication strength  - how much to take  - when to take this  - reasons to take this   Used for:  Mass of right side of neck        Dose:  600 mg   Take 600 mg by mouth every 4 hours as needed OTC dose unknown   Quantity:  120 capsule   Refills:  0         CONTINUE these medicines which have NOT CHANGED        Dose / Directions    hydrocortisone 1 % cream   Commonly known as:  CORTAID   Used for:  Rash of neck        APPLY SPARINGLY EXTERNALLY TO THE AFFECTED AREA THREE TIMES DAILY FOR 14 DAYS   Quantity:  28.35 g   Refills:  0            Where to get your medicines      Some of these will need a paper prescription and others can be bought over the counter. Ask your nurse if you have questions.     Bring a paper prescription for each of these medications     HYDROcodone-acetaminophen 5-325 MG per tablet       You don't need a prescription for these medications     ibuprofen 200 MG capsule                Protect others around you: Learn how to safely use, store and throw away your medicines at www.disposemymeds.org.             Medication List: This is a list of all your medications and when to take them. Check marks  below indicate your daily home schedule. Keep this list as a reference.      Medications           Morning Afternoon Evening Bedtime As Needed    HYDROcodone-acetaminophen 5-325 MG per tablet   Commonly known as:  NORCO   Take 1 tablet by mouth every 6 hours as needed for other (Moderate to Severe Pain)   Last time this was given:  1 tablet on 10/6/2017  1:07 PM   Notes to Patient:  One pain pill taken at 1:10 pm.                                hydrocortisone 1 % cream   Commonly known as:  CORTAID   APPLY SPARINGLY EXTERNALLY TO THE AFFECTED AREA THREE TIMES DAILY FOR 14 DAYS                                ibuprofen 200 MG capsule   Take 600 mg by mouth every 4 hours as needed OTC dose unknown

## 2017-10-06 NOTE — ANESTHESIA POSTPROCEDURE EVALUATION
Patient: Liliam Galvan    Procedure(s):  BIOPSY RIGHT NECK MASS; EXCISION OF RIGHT CERVICAL LYMPH NODES - Wound Class: I-Clean    Diagnosis:RIGHT NECK MASS  Diagnosis Additional Information: No value filed.    Anesthesia Type:  General, ETT    Note:  Anesthesia Post Evaluation    Patient location during evaluation: PACU  Patient participation: Able to fully participate in evaluation  Level of consciousness: awake  Pain management: adequate  Airway patency: patent  Cardiovascular status: acceptable  Respiratory status: acceptable  Hydration status: acceptable  PONV: none     Anesthetic complications: None          Last vitals:  Vitals:    10/06/17 1230 10/06/17 1245 10/06/17 1300   BP: 106/69 110/77 114/77   Resp: 19 16 16   Temp: 36.6  C (97.9  F) 36.9  C (98.4  F) 36.9  C (98.4  F)   SpO2: 100% 100% 94%         Electronically Signed By: ROSANNE DE JESUS MD  October 6, 2017  1:39 PM

## 2017-10-06 NOTE — ANESTHESIA CARE TRANSFER NOTE
Patient: Liliam Galvan    Procedure(s):  BIOPSY RIGHT NECK MASS; EXCISION OF RIGHT CERVICAL LYMPH NODES - Wound Class: I-Clean    Diagnosis: RIGHT NECK MASS  Diagnosis Additional Information: No value filed.    Anesthesia Type:   General, ETT     Note:  Airway :Face Mask  Patient transferred to:PACU      Neuromuscular blockade reversed with sugammadex after TOF 2/4, spontaneous respirations, adequate tidal volumes, oropharynx suctioned with soft flexible catheter, extubated atraumatically, extubated with suction, airway patent after extubation.  Oxygen via facemask at 10 liters per minute to PACU. Oxygen tubing connected to wall O2 in PACU, SpO2, NiBP, and EKG monitors and alarms on and functioning, Jean Marie Hugger warmer connected to patient gown, report on patient's clinical status given to PACU RN, RN questions answered.     Electronically Signed By: MARITZA Crowder CRNA  October 6, 2017  12:14 PM

## 2017-10-06 NOTE — IP AVS SNAPSHOT
[place] [address]    [place] [address]    Phone:  [phone]                                       After Visit Summary   10/6/2017    Liliam Galvan    MRN: [id number]           After Visit Summary Signature Page     I have received my discharge instructions, and my questions have been answered. I have discussed any challenges I see with this plan with the nurse or doctor.    ..........................................................................................................................................  Patient/Patient Representative Signature      ..........................................................................................................................................  Patient Representative Print Name and Relationship to Patient    ..................................................               ................................................  Date                                            Time    ..........................................................................................................................................  Reviewed by Signature/Title    ...................................................              ..............................................  Date                                                            Time

## 2017-10-10 LAB — COPATH REPORT: NORMAL

## 2017-10-16 ENCOUNTER — HOSPITAL ENCOUNTER (OUTPATIENT)
Facility: CLINIC | Age: 32
Discharge: HOME | End: 2017-10-16
Attending: PATHOLOGY | Admitting: PATHOLOGY
Payer: COMMERCIAL

## 2017-10-16 ENCOUNTER — ANESTHESIA EVENT (OUTPATIENT)
Dept: GASTROENTEROLOGY | Facility: CLINIC | Age: 32
End: 2017-10-16
Payer: COMMERCIAL

## 2017-10-16 ENCOUNTER — ANESTHESIA (OUTPATIENT)
Dept: GASTROENTEROLOGY | Facility: CLINIC | Age: 32
End: 2017-10-16
Payer: COMMERCIAL

## 2017-10-16 ENCOUNTER — SURGERY (OUTPATIENT)
Age: 32
End: 2017-10-16

## 2017-10-16 VITALS
OXYGEN SATURATION: 97 % | BODY MASS INDEX: 28.52 KG/M2 | DIASTOLIC BLOOD PRESSURE: 80 MMHG | RESPIRATION RATE: 16 BRPM | SYSTOLIC BLOOD PRESSURE: 108 MMHG | HEIGHT: 62 IN | WEIGHT: 155 LBS

## 2017-10-16 DIAGNOSIS — R59.0 LAD (LYMPHADENOPATHY), MEDIASTINAL: Primary | ICD-10-CM

## 2017-10-16 LAB
BASOPHILS # BLD AUTO: 0 10E9/L (ref 0–0.2)
BASOPHILS NFR BLD AUTO: 0.2 %
DIFFERENTIAL METHOD BLD: ABNORMAL
EOSINOPHIL # BLD AUTO: 0.4 10E9/L (ref 0–0.7)
EOSINOPHIL NFR BLD AUTO: 2.8 %
ERYTHROCYTE [DISTWIDTH] IN BLOOD BY AUTOMATED COUNT: 14.9 % (ref 10–15)
HCT VFR BLD AUTO: 36.2 % (ref 35–47)
HGB BLD-MCNC: 11.6 G/DL (ref 11.7–15.7)
IMM GRANULOCYTES # BLD: 0 10E9/L (ref 0–0.4)
IMM GRANULOCYTES NFR BLD: 0.2 %
LYMPHOCYTES # BLD AUTO: 1.2 10E9/L (ref 0.8–5.3)
LYMPHOCYTES NFR BLD AUTO: 9.5 %
MCH RBC QN AUTO: 26.1 PG (ref 26.5–33)
MCHC RBC AUTO-ENTMCNC: 32 G/DL (ref 31.5–36.5)
MCV RBC AUTO: 81 FL (ref 78–100)
MONOCYTES # BLD AUTO: 1 10E9/L (ref 0–1.3)
MONOCYTES NFR BLD AUTO: 8.1 %
NEUTROPHILS # BLD AUTO: 10 10E9/L (ref 1.6–8.3)
NEUTROPHILS NFR BLD AUTO: 79.2 %
NRBC # BLD AUTO: 0 10*3/UL
NRBC BLD AUTO-RTO: 0 /100
PLATELET # BLD AUTO: 339 10E9/L (ref 150–450)
RBC # BLD AUTO: 4.45 10E12/L (ref 3.8–5.2)
RETICS # AUTO: 55.2 10E9/L (ref 25–95)
RETICS/RBC NFR AUTO: 1.2 % (ref 0.5–2)
WBC # BLD AUTO: 12.7 10E9/L (ref 4–11)

## 2017-10-16 PROCEDURE — 00000159 ZZHCL STATISTIC H-SEND OUTS PREP: Performed by: INTERNAL MEDICINE

## 2017-10-16 PROCEDURE — 36415 COLL VENOUS BLD VENIPUNCTURE: CPT | Performed by: PATHOLOGY

## 2017-10-16 PROCEDURE — 88311 DECALCIFY TISSUE: CPT | Performed by: INTERNAL MEDICINE

## 2017-10-16 PROCEDURE — 25000125 ZZHC RX 250: Performed by: NURSE ANESTHETIST, CERTIFIED REGISTERED

## 2017-10-16 PROCEDURE — 88313 SPECIAL STAINS GROUP 2: CPT | Mod: 91 | Performed by: INTERNAL MEDICINE

## 2017-10-16 PROCEDURE — 85045 AUTOMATED RETICULOCYTE COUNT: CPT | Performed by: PATHOLOGY

## 2017-10-16 PROCEDURE — 38221 DX BONE MARROW BIOPSIES: CPT | Performed by: PATHOLOGY

## 2017-10-16 PROCEDURE — G0364 BONE MARROW ASPIRATE &BIOPSY: HCPCS

## 2017-10-16 PROCEDURE — 88237 TISSUE CULTURE BONE MARROW: CPT | Performed by: INTERNAL MEDICINE

## 2017-10-16 PROCEDURE — 88305 TISSUE EXAM BY PATHOLOGIST: CPT | Mod: 26 | Performed by: INTERNAL MEDICINE

## 2017-10-16 PROCEDURE — 88305 TISSUE EXAM BY PATHOLOGIST: CPT | Performed by: INTERNAL MEDICINE

## 2017-10-16 PROCEDURE — G0364 BONE MARROW ASPIRATE &BIOPSY: HCPCS | Performed by: INTERNAL MEDICINE

## 2017-10-16 PROCEDURE — 40000948 ZZHCL STATISTIC BONE MARROW ASP TC 85097: Performed by: INTERNAL MEDICINE

## 2017-10-16 PROCEDURE — 88182 CELL MARKER STUDY: CPT | Performed by: INTERNAL MEDICINE

## 2017-10-16 PROCEDURE — 37000008 ZZH ANESTHESIA TECHNICAL FEE, 1ST 30 MIN: Performed by: PATHOLOGY

## 2017-10-16 PROCEDURE — 85097 BONE MARROW INTERPRETATION: CPT | Performed by: INTERNAL MEDICINE

## 2017-10-16 PROCEDURE — 40000010 ZZH STATISTIC ANES STAT CODE-CRNA PER MINUTE: Performed by: PATHOLOGY

## 2017-10-16 PROCEDURE — 88313 SPECIAL STAINS GROUP 2: CPT | Mod: 26,59 | Performed by: INTERNAL MEDICINE

## 2017-10-16 PROCEDURE — 40000424 ZZHCL STATISTIC BONE MARROW CORE PERF TC 38221: Performed by: INTERNAL MEDICINE

## 2017-10-16 PROCEDURE — 40000847 ZZHCL STATISTIC MORPHOLOGY W/INTERP HISTOLOGY TC 85060: Performed by: INTERNAL MEDICINE

## 2017-10-16 PROCEDURE — 88280 CHROMOSOME KARYOTYPE STUDY: CPT | Performed by: INTERNAL MEDICINE

## 2017-10-16 PROCEDURE — 85025 COMPLETE CBC W/AUTO DIFF WBC: CPT | Performed by: PATHOLOGY

## 2017-10-16 PROCEDURE — 88313 SPECIAL STAINS GROUP 2: CPT | Performed by: INTERNAL MEDICINE

## 2017-10-16 PROCEDURE — 88311 DECALCIFY TISSUE: CPT | Mod: 26 | Performed by: INTERNAL MEDICINE

## 2017-10-16 PROCEDURE — 85060 BLOOD SMEAR INTERPRETATION: CPT | Performed by: INTERNAL MEDICINE

## 2017-10-16 PROCEDURE — 88305 TISSUE EXAM BY PATHOLOGIST: CPT | Mod: 26,59 | Performed by: INTERNAL MEDICINE

## 2017-10-16 PROCEDURE — 40000795 ZZHCL STATISTIC DNA PROCESS AND HOLD: Performed by: PATHOLOGY

## 2017-10-16 PROCEDURE — 25000128 H RX IP 250 OP 636: Performed by: NURSE ANESTHETIST, CERTIFIED REGISTERED

## 2017-10-16 PROCEDURE — 88264 CHROMOSOME ANALYSIS 20-25: CPT | Performed by: INTERNAL MEDICINE

## 2017-10-16 PROCEDURE — 38221 DX BONE MARROW BIOPSIES: CPT | Performed by: INTERNAL MEDICINE

## 2017-10-16 PROCEDURE — 00000058 ZZHCL STATISTIC BONE MARROW ASP PERF TC 38220: Performed by: INTERNAL MEDICINE

## 2017-10-16 RX ORDER — NALOXONE HYDROCHLORIDE 0.4 MG/ML
.1-.4 INJECTION, SOLUTION INTRAMUSCULAR; INTRAVENOUS; SUBCUTANEOUS
Status: CANCELLED | OUTPATIENT
Start: 2017-10-16 | End: 2017-10-17

## 2017-10-16 RX ORDER — FLUMAZENIL 0.1 MG/ML
0.2 INJECTION, SOLUTION INTRAVENOUS
Status: CANCELLED | OUTPATIENT
Start: 2017-10-16 | End: 2017-10-17

## 2017-10-16 RX ORDER — FENTANYL CITRATE 50 UG/ML
25-50 INJECTION, SOLUTION INTRAMUSCULAR; INTRAVENOUS
Status: CANCELLED | OUTPATIENT
Start: 2017-10-16

## 2017-10-16 RX ORDER — HYDROMORPHONE HYDROCHLORIDE 1 MG/ML
.3-.5 INJECTION, SOLUTION INTRAMUSCULAR; INTRAVENOUS; SUBCUTANEOUS EVERY 10 MIN PRN
Status: CANCELLED | OUTPATIENT
Start: 2017-10-16

## 2017-10-16 RX ORDER — LIDOCAINE HYDROCHLORIDE 20 MG/ML
INJECTION, SOLUTION INFILTRATION; PERINEURAL PRN
Status: DISCONTINUED | OUTPATIENT
Start: 2017-10-16 | End: 2017-10-16

## 2017-10-16 RX ORDER — ONDANSETRON 2 MG/ML
4 INJECTION INTRAMUSCULAR; INTRAVENOUS EVERY 30 MIN PRN
Status: CANCELLED | OUTPATIENT
Start: 2017-10-16

## 2017-10-16 RX ORDER — FENTANYL CITRATE 50 UG/ML
INJECTION, SOLUTION INTRAMUSCULAR; INTRAVENOUS PRN
Status: DISCONTINUED | OUTPATIENT
Start: 2017-10-16 | End: 2017-10-16

## 2017-10-16 RX ORDER — LIDOCAINE 40 MG/G
CREAM TOPICAL
Status: CANCELLED | OUTPATIENT
Start: 2017-10-16

## 2017-10-16 RX ORDER — SODIUM CHLORIDE, SODIUM LACTATE, POTASSIUM CHLORIDE, CALCIUM CHLORIDE 600; 310; 30; 20 MG/100ML; MG/100ML; MG/100ML; MG/100ML
INJECTION, SOLUTION INTRAVENOUS CONTINUOUS PRN
Status: DISCONTINUED | OUTPATIENT
Start: 2017-10-16 | End: 2017-10-16

## 2017-10-16 RX ORDER — PROPOFOL 10 MG/ML
INJECTION, EMULSION INTRAVENOUS PRN
Status: DISCONTINUED | OUTPATIENT
Start: 2017-10-16 | End: 2017-10-16

## 2017-10-16 RX ORDER — FENTANYL CITRATE 50 UG/ML
25-50 INJECTION, SOLUTION INTRAMUSCULAR; INTRAVENOUS EVERY 5 MIN PRN
Status: CANCELLED | OUTPATIENT
Start: 2017-10-16

## 2017-10-16 RX ORDER — LIDOCAINE HYDROCHLORIDE 10 MG/ML
8-10 INJECTION, SOLUTION EPIDURAL; INFILTRATION; INTRACAUDAL; PERINEURAL
Status: CANCELLED | OUTPATIENT
Start: 2017-10-16

## 2017-10-16 RX ORDER — MEPERIDINE HYDROCHLORIDE 25 MG/ML
12.5 INJECTION INTRAMUSCULAR; INTRAVENOUS; SUBCUTANEOUS
Status: CANCELLED | OUTPATIENT
Start: 2017-10-16

## 2017-10-16 RX ORDER — FENTANYL CITRATE 50 UG/ML
50-100 INJECTION, SOLUTION INTRAMUSCULAR; INTRAVENOUS
Status: CANCELLED | OUTPATIENT
Start: 2017-10-16

## 2017-10-16 RX ORDER — SODIUM CHLORIDE, SODIUM LACTATE, POTASSIUM CHLORIDE, CALCIUM CHLORIDE 600; 310; 30; 20 MG/100ML; MG/100ML; MG/100ML; MG/100ML
INJECTION, SOLUTION INTRAVENOUS CONTINUOUS
Status: CANCELLED | OUTPATIENT
Start: 2017-10-16

## 2017-10-16 RX ORDER — ONDANSETRON 4 MG/1
4 TABLET, ORALLY DISINTEGRATING ORAL EVERY 30 MIN PRN
Status: CANCELLED | OUTPATIENT
Start: 2017-10-16

## 2017-10-16 RX ADMIN — PROPOFOL 10 MG: 10 INJECTION, EMULSION INTRAVENOUS at 12:00

## 2017-10-16 RX ADMIN — FENTANYL CITRATE 50 MCG: 50 INJECTION, SOLUTION INTRAMUSCULAR; INTRAVENOUS at 11:53

## 2017-10-16 RX ADMIN — LIDOCAINE HYDROCHLORIDE 80 MG: 20 INJECTION, SOLUTION INFILTRATION; PERINEURAL at 11:55

## 2017-10-16 RX ADMIN — PROPOFOL 20 MG: 10 INJECTION, EMULSION INTRAVENOUS at 12:01

## 2017-10-16 RX ADMIN — SODIUM CHLORIDE, POTASSIUM CHLORIDE, SODIUM LACTATE AND CALCIUM CHLORIDE: 600; 310; 30; 20 INJECTION, SOLUTION INTRAVENOUS at 11:53

## 2017-10-16 RX ADMIN — MIDAZOLAM HYDROCHLORIDE 2 MG: 1 INJECTION, SOLUTION INTRAMUSCULAR; INTRAVENOUS at 11:53

## 2017-10-16 RX ADMIN — FENTANYL CITRATE 25 MCG: 50 INJECTION, SOLUTION INTRAMUSCULAR; INTRAVENOUS at 12:00

## 2017-10-16 RX ADMIN — PROPOFOL 20 MG: 10 INJECTION, EMULSION INTRAVENOUS at 11:59

## 2017-10-16 RX ADMIN — PROPOFOL 20 MG: 10 INJECTION, EMULSION INTRAVENOUS at 11:58

## 2017-10-16 ASSESSMENT — ENCOUNTER SYMPTOMS: SEIZURES: 0

## 2017-10-16 NOTE — ANESTHESIA CARE TRANSFER NOTE
Patient: Liliam Galvan    Procedure(s):  UNILATERAL BONE MARROW BIOPSY (CONSCIOUS SEDATION)     Diagnosis: HODGKINS LYMPHOMA   Diagnosis Additional Information: No value filed.    Anesthesia Type:   MAC     Note:  Airway :Room Air  Patient transferred to:Phase II  Comments: At end of procedure, spontaneous respirations, patient alert to voice, able to follow commands. Patient breathing room air to PACU. SpO2, NiBP, and EKG monitors and alarms on and functioning, report on patient's clinical status given to PACU RN, RN questions answered.Handoff Report: Identifed the Patient, Identified the Reponsible Provider, Reviewed the pertinent medical history, Discussed the surgical course, Reviewed Intra-OP anesthesia mangement and issues during anesthesia, Set expectations for post-procedure period and Allowed opportunity for questions and acknowledgement of understanding      Vitals: (Last set prior to Anesthesia Care Transfer)    CRNA VITALS  10/16/2017 1137 - 10/16/2017 1213      10/16/2017             Resp Rate (set): 10                Electronically Signed By: MARITZA Nunez CRNA  October 16, 2017  12:13 PM

## 2017-10-16 NOTE — ANESTHESIA POSTPROCEDURE EVALUATION
Patient: Liliam Galvan    Procedure(s):  UNILATERAL BONE MARROW BIOPSY (CONSCIOUS SEDATION)     Diagnosis:HODGKINS LYMPHOMA   Diagnosis Additional Information: No value filed.    Anesthesia Type:  MAC    Note:  Anesthesia Post Evaluation    Patient location during evaluation: Endoscopy Recovery  Patient participation: Able to fully participate in evaluation  Level of consciousness: awake  Pain management: adequate  Airway patency: patent  Cardiovascular status: acceptable  Respiratory status: acceptable  Hydration status: acceptable  PONV: none     Anesthetic complications: None          Last vitals:  Vitals:    10/16/17 1110 10/16/17 1220 10/16/17 1230   BP: 113/73 120/83 108/80   Resp: 16     SpO2: 99% 99% 97%         Electronically Signed By: Lc Yost MD  October 16, 2017  4:33 PM

## 2017-10-16 NOTE — ANESTHESIA PREPROCEDURE EVALUATION
Procedure: Procedure(s):  BIOPSY BONE MARROW  Preop diagnosis: HODGKINS LYMPHOMA     Allergies   Allergen Reactions     Nkda [No Known Drug Allergies]      Past Medical History:   Diagnosis Date     Anemia      Neck mass     RIGHT     Past Surgical History:   Procedure Laterality Date     BIOPSY MASS NECK Right 10/6/2017    Procedure: BIOPSY MASS NECK;  BIOPSY RIGHT NECK MASS; EXCISION OF RIGHT CERVICAL LYMPH NODES;  Surgeon: Roberto Jonas MD;  Location: [facility] OR     NO HISTORY OF SURGERY       Social History   Substance Use Topics     Smoking status: Never Smoker     Smokeless tobacco: Never Used     Alcohol use No     Prior to Admission medications    Medication Sig Start Date End Date Taking? Authorizing Provider   ibuprofen 200 MG capsule Take 600 mg by mouth every 4 hours as needed OTC dose unknown 10/6/17   Whitney Bloom PA-C   HYDROcodone-acetaminophen (NORCO) 5-325 MG per tablet Take 1 tablet by mouth every 6 hours as needed for other (Moderate to Severe Pain) 10/6/17   Whitney Bloom PA-C   hydrocortisone (CORTAID) 1 % cream APPLY SPARINGLY EXTERNALLY TO THE AFFECTED AREA THREE TIMES DAILY FOR 14 DAYS 10/2/17   Kendrick Sierra MD     No current Epic-ordered facility-administered medications on file.      No current [facility]-ordered outpatient prescriptions on file.       Wt Readings from Last 1 Encounters:   10/16/17 70.3 kg (155 lb)     Temp Readings from Last 1 Encounters:   10/06/17 36.9  C (98.4  F)     BP Readings from Last 6 Encounters:   10/16/17 113/73   10/06/17 111/71   09/27/17 109/68   09/26/17 110/70   09/25/17 110/70   07/07/17 110/66     Pulse Readings from Last 4 Encounters:   09/27/17 94   09/26/17 129   09/25/17 126   07/07/17 68     Resp Readings from Last 1 Encounters:   10/16/17 16     SpO2 Readings from Last 1 Encounters:   10/16/17 99%     Recent Labs   Lab Test  09/27/17   0912  09/25/17   0902   NA  138  139   POTASSIUM  3.7  4.0   CHLORIDE  103  104   CO2  26  27    ANIONGAP  9  8   GLC  81  93   BUN  9  5*   CR  0.56  0.52   JULIANA  9.1  9.0     Recent Labs   Lab Test  09/25/17   0902  07/03/17   0854   AST  16  13   ALT  17  18   ALKPHOS  89  79   BILITOTAL  0.4  0.4     Recent Labs   Lab Test  10/16/17   1114  09/27/17   0912   WBC  12.7*  8.7   HGB  11.6*  11.4*   PLT  339  313     Recent Labs   Lab Test  12/09/15   1922   ABO  O   RH   Pos     Recent Labs   Lab Test  10/06/17   0815   HCG  Negative     Recent Results (from the past 744 hour(s))   CT Soft Tissue Neck w Contrast    Narrative    CT OF THE NECK WITH CONTRAST  9/25/2017 4:58 PM     COMPARISON: None.    HISTORY: Localized swelling, mass and lump, neck    TECHNIQUE:  Axial CT images of the neck were acquired after the  intravenous administration of 80mL Isovue-370 nonionic iodinated  contrast material. Coronal reconstructions were created.    FINDINGS: There is massive lymphadenopathy throughout the mediastinum.  There is mild lymphadenopathy in the supraclavicular left neck. There  is massive conglomerate lymphadenopathy in the supraclavicular right  neck with marked lymphadenopathy in the right neck extending up to the  level of the hyoid bone. There is no suprahyoid lymphadenopathy in  either side of the neck. There is extensive fat stranding intermingled  among the lymph nodes in the right neck suggesting an inflammatory  process. A few of the lymph nodes in the right neck demonstrate  low-density suggesting necrosis. While lymphatic malignancy must  certainly be considered, an infectious etiology suggest tuberculosis  must also be considered. Clinical correlation recommended.    There are no fluid collections or abscesses anyone the neck. The  salivary glands are unremarkable.    Although the thyroid gland itself is normal in contour and density,  there is marked leftward shift of the aerodigestive tract from the  level of the larynx down to the upper thoracic trachea. Despite this  midline shift, the airway  from the nasopharynx to the cynthia remains  widely patent.    There is no sinusitis or mastoiditis. The visualized bones are  unremarkable. The lung apices are clear.      Impression    IMPRESSION:  1. Massive lymphadenopathy of the mediastinum and right neck up to the  level of the hyoid bone as well as mild lymphadenopathy in the  supraclavicular left neck. While lymphatic malignancy must certainly  be considered, an infectious etiology such as tuberculosis is also  quite possible.  2. Mass effect with leftward deviation of the aerodigestive tract from  the larynx down to the upper thoracic trachea without significant  narrowing of the airway.  3. Otherwise, normal soft tissue neck CT.      Radiation dose for this scan was reduced using automated exposure  control, adjustment of the mA and/or kV according to patient size, or  iterative reconstruction technique    ALEC GREENE MD   CT Chest/Abdomen/Pelvis w Contrast    Narrative    CT CHEST, ABDOMEN AND PELVIS WITH CONTRAST   9/26/2017 2:34 PM     HISTORY: Massive lymphadenopathy.    TECHNIQUE: 77 mL Isovue-370. CT images of the chest, abdomen, and  pelvis following oral and nonionic intravenous contrast. Radiation  dose for this scan was reduced using automated exposure control,  adjustment of the mA and/or kV according to patient size, or iterative  reconstruction technique.    COMPARISON: None.    FINDINGS:     Chest: There is marked bilateral cervical adenopathy, right greater  than left. Adenopathy causes severe compression of the right internal  jugular, left internal jugular, bilateral subclavian, and bilateral  brachiocephalic veins. Extensive mediastinal adenopathy is in the  prevascular space, around the great vessels, in the pretracheal and  subcarinal regions, and extends into the right pulmonary hilum. There  is no left hilar adenopathy. No axillary adenopathy.    There are innumerable tiny nodular opacities in the right middle lobe.  The remainder  of the lungs are clear. No pleural or pericardial  effusion. Thyroid gland appears normal.    Abdomen: High density material in the gallbladder is likely excreted  contrast. The liver, spleen, pancreas, and adrenal glands appear  normal. There is no hydronephrosis or hydroureter. No solid renal  lesions. No abdominal or retroperitoneal adenopathy.    Pelvis: The uterus and adnexa are unremarkable. No pelvic free fluid.  No pelvic lymphadenopathy or mass.      Impression    IMPRESSION: Massive cervical, mediastinal, and right hilar  lymphadenopathy. There are also abnormal reticulonodular opacities in  the right middle lobe. Diagnostic possibilities include lymphoma and  tuberculosis. Other atypical infections not excluded.    ALE WASHINGTON MD   XR Chest 2 Views    Narrative    CHEST TWO VIEWS   9/26/2017 2:55 PM     HISTORY: Rule out TB. CT from prior day shows massive lymphadenopathy.    COMPARISON: 2/2/2009. Chest CT 9/26/2017.    FINDINGS: Again noted there is a mass along the right side of the  mediastinum, right hilum and to a lesser degree the left superior  mediastinum, consistent with massive lymphadenopathy seen on recent  CT. Reticulonodular opacities are seen throughout the right middle  lobe. No pneumothorax or pleural effusion. No acute osseous  abnormality.      Impression    IMPRESSION: Mediastinal and right hilar mass, consistent with  adenopathy on recent CT. Reticulonodular opacities are also seen in  the right middle lobe. Differential includes lymphoma or tuberculosis.  Other atypical infections are not excluded.    NIKI ROSE, DO   US Biopsy Lymph Node Core    Narrative    ULTRASOUND BIOPSY CORE LYMPH NODE  9/26/2017 3:20 PM     HISTORY: Right cervical lymphadenopathy.    COMPARISON: None.    MEDICATIONS: 5 mL 1% lidocaine.     TECHNIQUE: Informed consent was obtained from the patient. A timeout  was performed. The skin was prepped and draped in a sterile manner. 1%  lidocaine was used  for local anesthesia. Under ultrasound guidance, a  17-gauge access needle was advanced into an abnormally enlarged right  cervical lymph node. Through this needle, multiple 18-gauge core  biopsy specimens were obtained and submitted to pathology. The patient  tolerated the procedure well with no immediate complications.    FINDINGS: Limited ultrasound images demonstrate needle access to an  enlarged hypoechoic lymph node in the right neck.      Impression    IMPRESSION: Ultrasound-guided biopsy of a right cervical lymph node.    ALE WASHINGTON MD       RECENT LABS:   ECG:   ECHO:     Anesthesia Evaluation     . Pt has had prior anesthetic. Type: General    No history of anesthetic complications          ROS/MED HX    ENT/Pulmonary:     (+)other ENT- cough; hoarseness;, , . .   (-) sleep apnea   Neurologic:      (-) seizures and CVA   Cardiovascular:         METS/Exercise Tolerance:     Hematologic:         Musculoskeletal:         GI/Hepatic:        (-) GERD   Renal/Genitourinary:         Endo:         Psychiatric:         Infectious Disease:         Malignancy:         Other:                     Physical Exam  Normal systems: cardiovascular, pulmonary and dental    Airway   Mallampati: II  TM distance: >3 FB  Neck ROM: full    Dental     Cardiovascular       Pulmonary     Other findings: Soft neck/clavicular mass on right;                    Anesthesia Plan      History & Physical Review      ASA Status:  2 .    NPO Status:  > 8 hours    Plan for MAC   PONV prophylaxis:  Ondansetron (or other 5HT-3)       Postoperative Care  Postoperative pain management:  Multi-modal analgesia.      Consents

## 2017-10-16 NOTE — PROCEDURES
The patient here for staging of Classical Hodgkin lymphoma was positively identified and informed consent was obtained (see the completed Affirmation of Consent for Bone Marrow Aspiration and/or Biopsy Procedure(s) form in the patient's chart). The patient was placed in the prone position and the bony landmarks of the pelvis were identified. Medical staff reconfirmed the patient's name, date of birth and procedure. The skin over the posterior iliac crest was scrubbed and draped in a sterile fashion. The local area of the procedure was anesthetized with a total of 8 mL of 1% Lidocaine and a small incision was made.  The patient did receive conscious sedation.    Two Trephine bone marrow cores were obtained from the left posterior iliac crest. Bone marrow aspirate was obtained from the left posterior iliac crest for: morphology with possible immunophenotyping and/or cytogenetics and molecular diagnostics    Direct pressure was applied to the biopsy site with sterile gauze. The biopsy site was cleaned with alcohol and a sterile dressing was placed over the biopsy incision using a pressure bandage. The patient was then placed in the supine position to maintain pressure on the biopsy site. Post-procedure wound care instructions, including routine dressing instructions and analgesia, were given to the patient. The procedure was completed without complication.

## 2017-10-17 LAB
COPATH REPORT: NORMAL
COPATH REPORT: NORMAL

## 2017-10-18 ENCOUNTER — TRANSFERRED RECORDS (OUTPATIENT)
Dept: HEALTH INFORMATION MANAGEMENT | Facility: CLINIC | Age: 32
End: 2017-10-18

## 2017-10-18 DIAGNOSIS — C81.10 HODGKIN LYMPHOMA, NODULAR SCLEROSIS (H): Primary | ICD-10-CM

## 2017-10-18 LAB — COPATH REPORT: NORMAL

## 2017-10-23 ENCOUNTER — HOSPITAL ENCOUNTER (OUTPATIENT)
Dept: RESPIRATORY THERAPY | Facility: CLINIC | Age: 32
End: 2017-10-23
Attending: INTERNAL MEDICINE
Payer: COMMERCIAL

## 2017-10-23 DIAGNOSIS — C81.10 HODGKIN LYMPHOMA, NODULAR SCLEROSIS (H): ICD-10-CM

## 2017-10-23 PROCEDURE — 94726 PLETHYSMOGRAPHY LUNG VOLUMES: CPT

## 2017-10-23 PROCEDURE — 94060 EVALUATION OF WHEEZING: CPT

## 2017-10-23 PROCEDURE — 94729 DIFFUSING CAPACITY: CPT

## 2017-10-23 PROCEDURE — 25000125 ZZHC RX 250: Performed by: INTERNAL MEDICINE

## 2017-10-23 RX ORDER — ALBUTEROL SULFATE 0.83 MG/ML
2.5 SOLUTION RESPIRATORY (INHALATION) ONCE
Status: COMPLETED | OUTPATIENT
Start: 2017-10-23 | End: 2017-10-23

## 2017-10-23 RX ADMIN — ALBUTEROL SULFATE 2.5 MG: 2.5 SOLUTION RESPIRATORY (INHALATION) at 08:10

## 2017-10-24 ENCOUNTER — HOSPITAL ENCOUNTER (OUTPATIENT)
Dept: CARDIOLOGY | Facility: CLINIC | Age: 32
Discharge: HOME | End: 2017-10-24
Attending: INTERNAL MEDICINE | Admitting: INTERNAL MEDICINE
Payer: COMMERCIAL

## 2017-10-24 DIAGNOSIS — C81.10 HODGKIN LYMPHOMA, NODULAR SCLEROSIS (H): ICD-10-CM

## 2017-10-24 PROCEDURE — 93306 TTE W/DOPPLER COMPLETE: CPT

## 2017-10-24 PROCEDURE — 93306 TTE W/DOPPLER COMPLETE: CPT | Mod: 26 | Performed by: INTERNAL MEDICINE

## 2017-10-27 ENCOUNTER — HOSPITAL ENCOUNTER (OUTPATIENT)
Facility: CLINIC | Age: 32
Discharge: HOME | End: 2017-10-27
Attending: RADIOLOGY | Admitting: RADIOLOGY
Payer: COMMERCIAL

## 2017-10-27 ENCOUNTER — APPOINTMENT (OUTPATIENT)
Dept: INTERVENTIONAL RADIOLOGY/VASCULAR | Facility: CLINIC | Age: 32
End: 2017-10-27
Attending: INTERNAL MEDICINE
Payer: COMMERCIAL

## 2017-10-27 VITALS
HEIGHT: 62 IN | RESPIRATION RATE: 16 BRPM | TEMPERATURE: 98.4 F | OXYGEN SATURATION: 98 % | DIASTOLIC BLOOD PRESSURE: 77 MMHG | SYSTOLIC BLOOD PRESSURE: 115 MMHG | BODY MASS INDEX: 28.16 KG/M2 | HEART RATE: 87 BPM | WEIGHT: 153 LBS

## 2017-10-27 DIAGNOSIS — C81.90 HODGKIN LYMPHOMA (H): ICD-10-CM

## 2017-10-27 LAB
APTT PPP: 34 SEC (ref 22–37)
B-HCG SERPL-ACNC: <1 IU/L (ref 0–5)
ERYTHROCYTE [DISTWIDTH] IN BLOOD BY AUTOMATED COUNT: 15.3 % (ref 10–15)
HCT VFR BLD AUTO: 35.6 % (ref 35–47)
HGB BLD-MCNC: 11.4 G/DL (ref 11.7–15.7)
INR PPP: 1.05 (ref 0.86–1.14)
MCH RBC QN AUTO: 26 PG (ref 26.5–33)
MCHC RBC AUTO-ENTMCNC: 32 G/DL (ref 31.5–36.5)
MCV RBC AUTO: 81 FL (ref 78–100)
PLATELET # BLD AUTO: 349 10E9/L (ref 150–450)
RBC # BLD AUTO: 4.39 10E12/L (ref 3.8–5.2)
WBC # BLD AUTO: 10.2 10E9/L (ref 4–11)

## 2017-10-27 PROCEDURE — 27210905 ZZH KIT CR7

## 2017-10-27 PROCEDURE — 25000125 ZZHC RX 250

## 2017-10-27 PROCEDURE — 27210886 ZZH ACCESSORY CR5

## 2017-10-27 PROCEDURE — 36415 COLL VENOUS BLD VENIPUNCTURE: CPT | Performed by: RADIOLOGY

## 2017-10-27 PROCEDURE — 85610 PROTHROMBIN TIME: CPT | Performed by: RADIOLOGY

## 2017-10-27 PROCEDURE — 85027 COMPLETE CBC AUTOMATED: CPT | Performed by: RADIOLOGY

## 2017-10-27 PROCEDURE — 25000128 H RX IP 250 OP 636: Performed by: RADIOLOGY

## 2017-10-27 PROCEDURE — C1788 PORT, INDWELLING, IMP: HCPCS

## 2017-10-27 PROCEDURE — 85730 THROMBOPLASTIN TIME PARTIAL: CPT | Performed by: RADIOLOGY

## 2017-10-27 PROCEDURE — 40000854 ZZH STATISTIC SIMPLE TUBE INSERTION/CHARGE, PORT, CATH, FISTULOGRAM

## 2017-10-27 PROCEDURE — 25000128 H RX IP 250 OP 636

## 2017-10-27 PROCEDURE — 84702 CHORIONIC GONADOTROPIN TEST: CPT | Performed by: RADIOLOGY

## 2017-10-27 PROCEDURE — 36561 INSERT TUNNELED CV CATH: CPT

## 2017-10-27 PROCEDURE — 27211193 ZZ H WOUND GLUE CR1

## 2017-10-27 RX ORDER — FLUMAZENIL 0.1 MG/ML
0.2 INJECTION, SOLUTION INTRAVENOUS
Status: DISCONTINUED | OUTPATIENT
Start: 2017-10-27 | End: 2017-10-27 | Stop reason: HOSPADM

## 2017-10-27 RX ORDER — FENTANYL CITRATE 50 UG/ML
INJECTION, SOLUTION INTRAMUSCULAR; INTRAVENOUS
Status: COMPLETED
Start: 2017-10-27 | End: 2017-10-27

## 2017-10-27 RX ORDER — LIDOCAINE HYDROCHLORIDE 10 MG/ML
1-30 INJECTION, SOLUTION EPIDURAL; INFILTRATION; INTRACAUDAL; PERINEURAL
Status: COMPLETED | OUTPATIENT
Start: 2017-10-27 | End: 2017-10-27

## 2017-10-27 RX ORDER — HEPARIN SODIUM (PORCINE) LOCK FLUSH IV SOLN 100 UNIT/ML 100 UNIT/ML
SOLUTION INTRAVENOUS
Status: DISCONTINUED
Start: 2017-10-27 | End: 2017-10-27 | Stop reason: HOSPADM

## 2017-10-27 RX ORDER — FENTANYL CITRATE 50 UG/ML
25-50 INJECTION, SOLUTION INTRAMUSCULAR; INTRAVENOUS EVERY 5 MIN PRN
Status: DISCONTINUED | OUTPATIENT
Start: 2017-10-27 | End: 2017-10-27 | Stop reason: HOSPADM

## 2017-10-27 RX ORDER — NALOXONE HYDROCHLORIDE 0.4 MG/ML
.1-.4 INJECTION, SOLUTION INTRAMUSCULAR; INTRAVENOUS; SUBCUTANEOUS
Status: DISCONTINUED | OUTPATIENT
Start: 2017-10-27 | End: 2017-10-27 | Stop reason: HOSPADM

## 2017-10-27 RX ORDER — LIDOCAINE HYDROCHLORIDE 10 MG/ML
INJECTION, SOLUTION INFILTRATION; PERINEURAL
Status: DISCONTINUED
Start: 2017-10-27 | End: 2017-10-27 | Stop reason: HOSPADM

## 2017-10-27 RX ORDER — CEFAZOLIN SODIUM 2 G/100ML
2 INJECTION, SOLUTION INTRAVENOUS
Status: COMPLETED | OUTPATIENT
Start: 2017-10-27 | End: 2017-10-27

## 2017-10-27 RX ORDER — HEPARIN SODIUM 1000 [USP'U]/ML
INJECTION, SOLUTION INTRAVENOUS; SUBCUTANEOUS
Status: DISCONTINUED
Start: 2017-10-27 | End: 2017-10-27 | Stop reason: HOSPADM

## 2017-10-27 RX ORDER — LIDOCAINE 40 MG/G
CREAM TOPICAL
Status: DISCONTINUED | OUTPATIENT
Start: 2017-10-27 | End: 2017-10-27 | Stop reason: HOSPADM

## 2017-10-27 RX ORDER — HEPARIN SODIUM (PORCINE) LOCK FLUSH IV SOLN 100 UNIT/ML 100 UNIT/ML
500 SOLUTION INTRAVENOUS ONCE
Status: COMPLETED | OUTPATIENT
Start: 2017-10-27 | End: 2017-10-27

## 2017-10-27 RX ADMIN — FENTANYL CITRATE 75 MCG: 50 INJECTION, SOLUTION INTRAMUSCULAR; INTRAVENOUS at 12:12

## 2017-10-27 RX ADMIN — FENTANYL CITRATE 50 MCG: 50 INJECTION, SOLUTION INTRAMUSCULAR; INTRAVENOUS at 12:41

## 2017-10-27 RX ADMIN — MIDAZOLAM HYDROCHLORIDE 1 MG: 1 INJECTION, SOLUTION INTRAMUSCULAR; INTRAVENOUS at 12:42

## 2017-10-27 RX ADMIN — CEFAZOLIN SODIUM 2 G: 2 INJECTION, SOLUTION INTRAVENOUS at 12:04

## 2017-10-27 RX ADMIN — LIDOCAINE HYDROCHLORIDE 280 MG: 10 INJECTION, SOLUTION INFILTRATION; PERINEURAL at 12:42

## 2017-10-27 RX ADMIN — LIDOCAINE HYDROCHLORIDE 280 MG: 10 INJECTION, SOLUTION EPIDURAL; INFILTRATION; INTRACAUDAL; PERINEURAL at 12:42

## 2017-10-27 RX ADMIN — FENTANYL CITRATE 25 MCG: 50 INJECTION, SOLUTION INTRAMUSCULAR; INTRAVENOUS at 12:17

## 2017-10-27 RX ADMIN — MIDAZOLAM HYDROCHLORIDE 1.5 MG: 1 INJECTION, SOLUTION INTRAMUSCULAR; INTRAVENOUS at 12:13

## 2017-10-27 RX ADMIN — HEPARIN SODIUM (PORCINE) LOCK FLUSH IV SOLN 100 UNIT/ML 500 UNITS: 100 SOLUTION at 12:54

## 2017-10-27 RX ADMIN — SODIUM CHLORIDE, PRESERVATIVE FREE 500 UNITS: 5 INJECTION INTRAVENOUS at 12:54

## 2017-10-27 NOTE — IP AVS SNAPSHOT
MRN:[id number]                      After Visit Summary   10/27/2017    Liliam Galvan    MRN: [id number]           Visit Information        Department      10/27/2017  8:51 AM [facility]          Review of your medicines      UNREVIEWED medicines. Ask your doctor about these medicines        Dose / Directions    HYDROcodone-acetaminophen 5-325 MG per tablet   Commonly known as:  NORCO   Used for:  Mass of right side of neck        Dose:  1 tablet   Take 1 tablet by mouth every 6 hours as needed for other (Moderate to Severe Pain)   Quantity:  6 tablet   Refills:  0       hydrocortisone 1 % cream   Commonly known as:  CORTAID   Used for:  Rash of neck        APPLY SPARINGLY EXTERNALLY TO THE AFFECTED AREA THREE TIMES DAILY FOR 14 DAYS   Quantity:  28.35 g   Refills:  0       ibuprofen 200 MG capsule   Used for:  Mass of right side of neck        Dose:  600 mg   Take 600 mg by mouth every 4 hours as needed OTC dose unknown   Quantity:  120 capsule   Refills:  0                Protect others around you: Learn how to safely use, store and throw away your medicines at www.disposemymeds.org.         Follow-ups after your visit        Your next 10 appointments already scheduled     Oct 27, 2017 10:00 AM CDT   (Arrive by 8:30 AM)   IR CHEST PORT PLACEMENT > 5 YRS OF AGE with SHIR1   [facility] Interventional Radiology ([facility])    [address] [place] [id number] [phone]           1. Your doctor will need to do a history and physical within 7 days before this procedure. 2. Your doctor will which medications should not be taken the morning of the exam. 3. Laboratory tests are to be obtained by your doctor prior to the exam (Basic Metabolic Panel, CBCP, PTT and INR) (No labs needed if you are having a tunneled catheter exchange or removal) 4. If you have allergies to x-ray contrast or iodine, contact your doctor or a Radiology nurse prior  to the exam day for instructions. 5. Someone will need to drive you to and from the hospital. 6. If you are or may be pregnant, contact your doctor or a Radiology nurse prior to the day of the exam. 7. If you have diabetes, check with your doctor or a Radiology nurse to see if your insulin needs to be adjusted for the exam. 8. If you are taking a medication called Glucophage or Glucovance; these medications need to be held the day of the exam and for approximately 48 hours following. A blood sample must be drawn so your creatinine level can be checked before resuming this medication. 9. If you are taking Coumadin (to thin you blood) please contact your doctor or a Radiology nurse at least 3 days before the exam for special instructions. 10. You should not have received contrast within 48 hours of this exam. 11. The day before your exam you may eat your regular diet and are encouraged to drink at least 2 quarts of clear liquids. Drink no alcoholic beverages for 24 hours prior to the exam. 12. If you have a colostomy you will need to irrigate it with tap water at 8PM the evening before and again at 6AM the morning of the exam. 13. Do not smoke for 24 hours prior to the procedure. 14. Birth to 4 years: - Breast feeding must be stopped 4 hours prior to exam - Solid food or formula must be stopped 6 hours prior to exam - Tube feedings must be stopped 6 hours prior to exam 15. 4-10 years old: - Nothing to eat or drink 6 hours prior to exam 16. 10+ years old: - Nothing to eat or drink 8 hours prior to exam 17. The morning of the exam you may brush your teeth and take medications as directed with a sip of water. 18. When discharged, you cannot drive until morning, and an adult must be with you until then. You should stay in the [facility] overnight. 19. Bring a list of all drugs you are taking; include supplements and over-the-counter medications. Wear comfortable clothes and leave your valuables at home.                Care Instructions        Further instructions from your care team         Port Insertion Discharge Instructions     After you go home:      Have an adult stay with you for the first 6 hours    You may resume your normal diet       For 24 hours - due to the sedation you received:    Relax and take it easy    Do NOT make any important or legal decisions    Do NOT drive or operate machines at home or at work    Do NOT drink alcohol    Care of Puncture Sites:      Keep the dressings on your sites clean & dry for 3 days. Change it only if it gets wet or dirty.    You may shower after the dressing comes off in 3 days    Do not remove the small white strips of tape, if present. Allow them to fall off on their own.     You may cover the wound with a bandaid after the dressing is removed if needed for comfort      Activity       Avoid heavy lifting (greater than 10 pounds) or the overuse of your shoulder for 3 days    Bleeding:      If you start bleeding from the incision sites in your chest or neck - or have swelling in your neck, sit down and press on the site for 5-10 minutes.     If bleeding has not stopped after 10 minutes, call your provider.        Call 911 right away if you have heavy bleeding or bleeding that does not stop.      Medicines:      You may resume all medications    Resume your Warfarin/Coumadin at your regular dose today. Follow up with your provider to have your INR rechecked    Resume your Platelet Inhibitors and Aspirin tomorrow at your regular dose    For minor pain, you may take Acetaminophen (Tylenol) or Ibuprofen (Advil)    Call the provider who ordered this procedure if:      You have swelling in your neck or over your port site    The incision area is red, swollen, hot or tender    You have chills or a fever greater than 101 F (38 C)    Any questions or concerns    Call  911 or go to the Emergency Room if you have:      Severe chest pain or trouble breathing    Bleeding that you cannot  "control    Additional Information:      Your port may be accessed right away.     You will need to have your port flushed every 30 days or after each use.      If you have questions call:          [facility] Radiology Dept @ [phone]      The provider who performed your procedure was  Dr Bethea.       Additional Information About Your Visit        MyChart Information     [employer] gives you secure access to your electronic health record. If you see a primary care provider, you can also send messages to your care team and make appointments. If you have questions, please call your primary care clinic.  If you do not have a primary care provider, please call [phone] and they will assist you.        Care EveryWhere ID     This is your Care EveryWhere ID. This could be used by other organizations to access your [facility] medical records  [id number]        Your Vitals Were     Blood Pressure Pulse Temperature Respirations Height Weight    117/74 (BP Location: Right arm) 93 98.4  F (36.9  C) (Oral) 16 1.575 m (5' 2\") 69.4 kg (153 lb)    Last Period Pulse Oximetry BMI (Body Mass Index)             09/15/2017 100% 27.98 kg/m2          Primary Care Provider Office Phone # Fax #    [facility] [phone]      Equal Access to Services     PALOMO RIVERO AH: Hadii aad ku hadasho Soomaali, waaxda luqadaha, qaybta kaalmada adeegyada, waxay idiin hayjoana london. So [facility] [phone].    ATENCIÓN: Si habla español, tiene a arizmendi disposición servicios gratuitos de asistencia lingüística. Llame al [phone].    We comply with applicable federal civil rights laws and [place] laws. We do not discriminate on the basis of race, color, national origin, age, disability, sex, sexual orientation, or gender identity.            Thank you!     Thank you for choosing [facility] for your care. Our goal is always to provide you with excellent care. Hearing back from our patients is one " way we can continue to improve our services. Please take a few minutes to complete the written survey that you may receive in the mail after you visit with us. Thank you!             Medication List: This is a list of all your medications and when to take them. Check marks below indicate your daily home schedule. Keep this list as a reference.      Medications           Morning Afternoon Evening Bedtime As Needed    HYDROcodone-acetaminophen 5-325 MG per tablet   Commonly known as:  NORCO   Take 1 tablet by mouth every 6 hours as needed for other (Moderate to Severe Pain)                                hydrocortisone 1 % cream   Commonly known as:  CORTAID   APPLY SPARINGLY EXTERNALLY TO THE AFFECTED AREA THREE TIMES DAILY FOR 14 DAYS                                ibuprofen 200 MG capsule   Take 600 mg by mouth every 4 hours as needed OTC dose unknown

## 2017-10-27 NOTE — PROGRESS NOTES
Pt here for port placement.  Assessment complete, d/c instructions given to pt and spouse.  Both state understanding.  HCG quant ordered and is <1, reported to IR dept.  HCG qualitative should have been ordered, however IR states not to reorder lab.

## 2017-10-27 NOTE — DISCHARGE INSTRUCTIONS
Port Insertion Discharge Instructions     After you go home:      Have an adult stay with you for the first 6 hours    You may resume your normal diet       For 24 hours - due to the sedation you received:    Relax and take it easy    Do NOT make any important or legal decisions    Do NOT drive or operate machines at home or at work    Do NOT drink alcohol    Care of Puncture Sites:      Keep the dressings on your sites clean & dry for 3 days. Change it only if it gets wet or dirty.    You may shower after the dressing comes off in 3 days    Do not remove the small white strips of tape, if present. Allow them to fall off on their own.     You may cover the wound with a bandaid after the dressing is removed if needed for comfort      Activity       Avoid heavy lifting (greater than 10 pounds) or the overuse of your shoulder for 3 days    Bleeding:      If you start bleeding from the incision sites in your chest or neck - or have swelling in your neck, sit down and press on the site for 5-10 minutes.     If bleeding has not stopped after 10 minutes, call your provider.        Call 911 right away if you have heavy bleeding or bleeding that does not stop.      Medicines:      You may resume all medications    Resume your Warfarin/Coumadin at your regular dose today. Follow up with your provider to have your INR rechecked    Resume your Platelet Inhibitors and Aspirin tomorrow at your regular dose    For minor pain, you may take Acetaminophen (Tylenol) or Ibuprofen (Advil)    Call the provider who ordered this procedure if:      You have swelling in your neck or over your port site    The incision area is red, swollen, hot or tender    You have chills or a fever greater than 101 F (38 C)    Any questions or concerns    Call  911 or go to the Emergency Room if you have:      Severe chest pain or trouble breathing    Bleeding that you cannot control    Additional Information:      Your port may be accessed right away.      You will need to have your port flushed every 30 days or after each use.      If you have questions call:          [facility] Radiology Dept @ [phone]      The provider who performed your procedure was  Dr Ramos_.

## 2017-10-27 NOTE — PROGRESS NOTES
Interventional Radiology Intra-procedural Nursing Note    Patient Name: Liliam Galvan  Medical Record Number: [id number]  Today's Date: October 27, 2017    Start Time: 1215  End of procedure time: 1259  Procedure: Right Port Placement  Report given to: care suites RN  Time pt departs:  1310    Other Notes: Patient into IR suite 1 via cart from care suites at 1205. Ancef hung to left PIV. Awake and alert to all spheres. VSS, normal sinus rhythm. Patient to table in supine position, prepped and draped with 2% chlorhexidine to right chest/neck. Dr. Ramos in room, timeout and procedure started. 6F slim port placed to right chest wall. Flushed with 500 units heparin. Patient tolerated procedure well. Versed 3 mg and fentanyl 150 mcg given. Debrief with Dr. Ramos, no complications. Dressing to right neck/chest clean, dry and intact. Patient back to care suites via cart. Bedside report given.     Laxmi Fuller RN

## 2017-10-27 NOTE — IP AVS SNAPSHOT
[place] [address]    [place] [address]    Phone:  [phone]                                       After Visit Summary   10/27/2017    Liliam Galvan    MRN: [id number]           After Visit Summary Signature Page     I have received my discharge instructions, and my questions have been answered. I have discussed any challenges I see with this plan with the nurse or doctor.    ..........................................................................................................................................  Patient/Patient Representative Signature      ..........................................................................................................................................  Patient Representative Print Name and Relationship to Patient    ..................................................               ................................................  Date                                            Time    ..........................................................................................................................................  Reviewed by Signature/Title    ...................................................              ..............................................  Date                                                            Time

## 2017-10-27 NOTE — PROGRESS NOTES
Return to CS at 1310.  Alert, VSS, denies pain.  Dressing to rt neck CDI.  Swelling at site pre procedure, no change.  Crackers and fluids given, will cont to monitor.  Call light in reach.  Pt has power port card and booklet.  Advised pt to keep card in wallet for future appts.

## 2017-10-30 ENCOUNTER — TRANSFERRED RECORDS (OUTPATIENT)
Dept: HEALTH INFORMATION MANAGEMENT | Facility: CLINIC | Age: 32
End: 2017-10-30

## 2017-11-01 LAB
DLCOCOR-%PRED-PRE: 75 %
DLCOCOR-PRE: 17.9 ML/MIN/MMHG
DLCOUNC-%PRED-PRE: 71 %
DLCOUNC-PRE: 16.82 ML/MIN/MMHG
DLCOUNC-PRED: 23.63 ML/MIN/MMHG
ERV-%PRED-PRE: 44 %
ERV-PRE: 0.42 L
ERV-PRED: 0.93 L
EXPTIME-PRE: 7.06 SEC
FEF2575-%PRED-POST: 67 %
FEF2575-%PRED-PRE: 58 %
FEF2575-POST: 2.07 L/SEC
FEF2575-PRE: 1.81 L/SEC
FEF2575-PRED: 3.08 L/SEC
FEFMAX-%PRED-PRE: 48 %
FEFMAX-PRE: 3.23 L/SEC
FEFMAX-PRED: 6.71 L/SEC
FEV1-%PRED-PRE: 59 %
FEV1-PRE: 1.62 L
FEV1FEV6-PRE: 83 %
FEV1FEV6-PRED: 85 %
FEV1FVC-PRE: 83 %
FEV1FVC-PRED: 85 %
FEV1SVC-PRE: 83 %
FEV1SVC-PRED: 77 %
FIFMAX-PRE: 1.67 L/SEC
FRCPLETH-%PRED-PRE: 71 %
FRCPLETH-PRE: 1.83 L
FRCPLETH-PRED: 2.56 L
FVC-%PRED-PRE: 60 %
FVC-PRE: 1.95 L
FVC-PRED: 3.22 L
IC-%PRED-PRE: 58 %
IC-PRE: 1.54 L
IC-PRED: 2.62 L
RVPLETH-%PRED-PRE: 101 %
RVPLETH-PRE: 1.42 L
RVPLETH-PRED: 1.4 L
TLCPLETH-%PRED-PRE: 73 %
TLCPLETH-PRE: 3.38 L
TLCPLETH-PRED: 4.6 L
VA-%PRED-PRE: 61 %
VA-PRE: 2.9 L
VC-%PRED-PRE: 55 %
VC-PRE: 1.96 L
VC-PRED: 3.55 L

## 2017-11-07 LAB — COPATH REPORT: NORMAL

## 2017-11-13 ENCOUNTER — TRANSFERRED RECORDS (OUTPATIENT)
Dept: HEALTH INFORMATION MANAGEMENT | Facility: CLINIC | Age: 32
End: 2017-11-13

## 2017-12-04 ENCOUNTER — TRANSFERRED RECORDS (OUTPATIENT)
Dept: HEALTH INFORMATION MANAGEMENT | Facility: CLINIC | Age: 32
End: 2017-12-04

## 2017-12-18 ENCOUNTER — TRANSFERRED RECORDS (OUTPATIENT)
Dept: HEALTH INFORMATION MANAGEMENT | Facility: CLINIC | Age: 32
End: 2017-12-18

## 2018-01-03 ENCOUNTER — TRANSFERRED RECORDS (OUTPATIENT)
Dept: HEALTH INFORMATION MANAGEMENT | Facility: CLINIC | Age: 33
End: 2018-01-03

## 2018-01-17 ENCOUNTER — TRANSFERRED RECORDS (OUTPATIENT)
Dept: HEALTH INFORMATION MANAGEMENT | Facility: CLINIC | Age: 33
End: 2018-01-17

## 2018-01-31 ENCOUNTER — TRANSFERRED RECORDS (OUTPATIENT)
Dept: HEALTH INFORMATION MANAGEMENT | Facility: CLINIC | Age: 33
End: 2018-01-31

## 2018-02-15 ENCOUNTER — TRANSFERRED RECORDS (OUTPATIENT)
Dept: HEALTH INFORMATION MANAGEMENT | Facility: CLINIC | Age: 33
End: 2018-02-15

## 2018-02-27 ENCOUNTER — TRANSFERRED RECORDS (OUTPATIENT)
Dept: HEALTH INFORMATION MANAGEMENT | Facility: CLINIC | Age: 33
End: 2018-02-27

## 2018-03-07 ENCOUNTER — HOSPITAL ENCOUNTER (OUTPATIENT)
Dept: CT IMAGING | Facility: CLINIC | Age: 33
End: 2018-03-07
Attending: RADIOLOGY
Payer: COMMERCIAL

## 2018-03-07 ENCOUNTER — HOSPITAL ENCOUNTER (OUTPATIENT)
Dept: CT IMAGING | Facility: CLINIC | Age: 33
Discharge: HOME | End: 2018-03-07
Attending: RADIOLOGY | Admitting: RADIOLOGY
Payer: COMMERCIAL

## 2018-03-07 DIAGNOSIS — C81.90 HODGKIN'S LYMPHOMA (H): ICD-10-CM

## 2018-03-07 PROCEDURE — 71260 CT THORAX DX C+: CPT

## 2018-03-07 PROCEDURE — 25000128 H RX IP 250 OP 636: Performed by: RADIOLOGY

## 2018-03-07 PROCEDURE — 70491 CT SOFT TISSUE NECK W/DYE: CPT

## 2018-03-07 RX ORDER — IOPAMIDOL 755 MG/ML
500 INJECTION, SOLUTION INTRAVASCULAR ONCE
Status: COMPLETED | OUTPATIENT
Start: 2018-03-07 | End: 2018-03-07

## 2018-03-07 RX ADMIN — SODIUM CHLORIDE 65 ML: 9 INJECTION, SOLUTION INTRAVENOUS at 11:07

## 2018-03-07 RX ADMIN — IOPAMIDOL 100 ML: 755 INJECTION, SOLUTION INTRAVENOUS at 11:07

## 2018-04-13 ENCOUNTER — TRANSFERRED RECORDS (OUTPATIENT)
Dept: HEALTH INFORMATION MANAGEMENT | Facility: CLINIC | Age: 33
End: 2018-04-13

## 2018-04-23 ENCOUNTER — TRANSFERRED RECORDS (OUTPATIENT)
Dept: HEALTH INFORMATION MANAGEMENT | Facility: CLINIC | Age: 33
End: 2018-04-23

## 2018-07-23 ENCOUNTER — TRANSFERRED RECORDS (OUTPATIENT)
Dept: HEALTH INFORMATION MANAGEMENT | Facility: CLINIC | Age: 33
End: 2018-07-23

## 2018-10-30 ENCOUNTER — TRANSFERRED RECORDS (OUTPATIENT)
Dept: HEALTH INFORMATION MANAGEMENT | Facility: CLINIC | Age: 33
End: 2018-10-30

## 2018-11-01 ENCOUNTER — HOSPITAL ENCOUNTER (OUTPATIENT)
Facility: CLINIC | Age: 33
Discharge: HOME | End: 2018-11-01
Attending: RADIOLOGY | Admitting: RADIOLOGY
Payer: COMMERCIAL

## 2018-11-01 ENCOUNTER — APPOINTMENT (OUTPATIENT)
Dept: INTERVENTIONAL RADIOLOGY/VASCULAR | Facility: CLINIC | Age: 33
End: 2018-11-01
Attending: INTERNAL MEDICINE
Payer: COMMERCIAL

## 2018-11-01 VITALS
DIASTOLIC BLOOD PRESSURE: 67 MMHG | SYSTOLIC BLOOD PRESSURE: 90 MMHG | RESPIRATION RATE: 16 BRPM | TEMPERATURE: 96.8 F | HEART RATE: 81 BPM | OXYGEN SATURATION: 100 %

## 2018-11-01 DIAGNOSIS — Z51.89 TREATMENT: ICD-10-CM

## 2018-11-01 LAB
APTT PPP: 32 SEC (ref 22–37)
B-HCG SERPL-ACNC: <1 IU/L (ref 0–5)
ERYTHROCYTE [DISTWIDTH] IN BLOOD BY AUTOMATED COUNT: 13.7 % (ref 10–15)
HCT VFR BLD AUTO: 37.6 % (ref 35–47)
HGB BLD-MCNC: 12.2 G/DL (ref 11.7–15.7)
INR PPP: 0.98 (ref 0.86–1.14)
MCH RBC QN AUTO: 27.9 PG (ref 26.5–33)
MCHC RBC AUTO-ENTMCNC: 32.4 G/DL (ref 31.5–36.5)
MCV RBC AUTO: 86 FL (ref 78–100)
PLATELET # BLD AUTO: 218 10E9/L (ref 150–450)
RBC # BLD AUTO: 4.38 10E12/L (ref 3.8–5.2)
WBC # BLD AUTO: 6 10E9/L (ref 4–11)

## 2018-11-01 PROCEDURE — 36415 COLL VENOUS BLD VENIPUNCTURE: CPT | Performed by: RADIOLOGY

## 2018-11-01 PROCEDURE — 25000128 H RX IP 250 OP 636: Performed by: RADIOLOGY

## 2018-11-01 PROCEDURE — 27211193 ZZ H WOUND GLUE CR1

## 2018-11-01 PROCEDURE — 85610 PROTHROMBIN TIME: CPT | Performed by: RADIOLOGY

## 2018-11-01 PROCEDURE — 85027 COMPLETE CBC AUTOMATED: CPT | Performed by: RADIOLOGY

## 2018-11-01 PROCEDURE — 27210905 ZZH KIT CR7

## 2018-11-01 PROCEDURE — 25000125 ZZHC RX 250

## 2018-11-01 PROCEDURE — 84702 CHORIONIC GONADOTROPIN TEST: CPT | Performed by: RADIOLOGY

## 2018-11-01 PROCEDURE — 40000854 ZZH STATISTIC SIMPLE TUBE INSERTION/CHARGE, PORT, CATH, FISTULOGRAM

## 2018-11-01 PROCEDURE — 85730 THROMBOPLASTIN TIME PARTIAL: CPT | Performed by: RADIOLOGY

## 2018-11-01 PROCEDURE — 25000128 H RX IP 250 OP 636

## 2018-11-01 PROCEDURE — 77001 FLUOROGUIDE FOR VEIN DEVICE: CPT

## 2018-11-01 PROCEDURE — 99152 MOD SED SAME PHYS/QHP 5/>YRS: CPT

## 2018-11-01 RX ORDER — LIDOCAINE HYDROCHLORIDE 10 MG/ML
INJECTION, SOLUTION INFILTRATION; PERINEURAL
Status: COMPLETED
Start: 2018-11-01 | End: 2018-11-01

## 2018-11-01 RX ORDER — FENTANYL CITRATE 50 UG/ML
25-50 INJECTION, SOLUTION INTRAMUSCULAR; INTRAVENOUS EVERY 5 MIN PRN
Status: DISCONTINUED | OUTPATIENT
Start: 2018-11-01 | End: 2018-11-01 | Stop reason: HOSPADM

## 2018-11-01 RX ORDER — FLUMAZENIL 0.1 MG/ML
0.2 INJECTION, SOLUTION INTRAVENOUS
Status: DISCONTINUED | OUTPATIENT
Start: 2018-11-01 | End: 2018-11-01 | Stop reason: HOSPADM

## 2018-11-01 RX ORDER — NICOTINE POLACRILEX 4 MG
15-30 LOZENGE BUCCAL
Status: DISCONTINUED | OUTPATIENT
Start: 2018-11-01 | End: 2018-11-01 | Stop reason: HOSPADM

## 2018-11-01 RX ORDER — DEXTROSE MONOHYDRATE 25 G/50ML
25-50 INJECTION, SOLUTION INTRAVENOUS
Status: DISCONTINUED | OUTPATIENT
Start: 2018-11-01 | End: 2018-11-01 | Stop reason: HOSPADM

## 2018-11-01 RX ORDER — NALOXONE HYDROCHLORIDE 0.4 MG/ML
.1-.4 INJECTION, SOLUTION INTRAMUSCULAR; INTRAVENOUS; SUBCUTANEOUS
Status: DISCONTINUED | OUTPATIENT
Start: 2018-11-01 | End: 2018-11-01 | Stop reason: HOSPADM

## 2018-11-01 RX ORDER — FENTANYL CITRATE 50 UG/ML
INJECTION, SOLUTION INTRAMUSCULAR; INTRAVENOUS
Status: COMPLETED
Start: 2018-11-01 | End: 2018-11-01

## 2018-11-01 RX ORDER — LIDOCAINE 40 MG/G
CREAM TOPICAL
Status: DISCONTINUED | OUTPATIENT
Start: 2018-11-01 | End: 2018-11-01 | Stop reason: HOSPADM

## 2018-11-01 RX ORDER — LIDOCAINE HYDROCHLORIDE 10 MG/ML
1-30 INJECTION, SOLUTION EPIDURAL; INFILTRATION; INTRACAUDAL; PERINEURAL
Status: COMPLETED | OUTPATIENT
Start: 2018-11-01 | End: 2018-11-01

## 2018-11-01 RX ORDER — CEFAZOLIN SODIUM 2 G/100ML
2 INJECTION, SOLUTION INTRAVENOUS
Status: COMPLETED | OUTPATIENT
Start: 2018-11-01 | End: 2018-11-01

## 2018-11-01 RX ADMIN — FENTANYL CITRATE 25 MCG: 50 INJECTION INTRAMUSCULAR; INTRAVENOUS at 09:01

## 2018-11-01 RX ADMIN — LIDOCAINE HYDROCHLORIDE 17 ML: 10 INJECTION, SOLUTION INFILTRATION; PERINEURAL at 09:01

## 2018-11-01 RX ADMIN — MIDAZOLAM HYDROCHLORIDE 0.5 MG: 1 INJECTION, SOLUTION INTRAMUSCULAR; INTRAVENOUS at 08:51

## 2018-11-01 RX ADMIN — FENTANYL CITRATE 50 MCG: 50 INJECTION INTRAMUSCULAR; INTRAVENOUS at 08:41

## 2018-11-01 RX ADMIN — CEFAZOLIN SODIUM 2 G: 2 INJECTION, SOLUTION INTRAVENOUS at 08:27

## 2018-11-01 RX ADMIN — FENTANYL CITRATE 25 MCG: 50 INJECTION INTRAMUSCULAR; INTRAVENOUS at 08:51

## 2018-11-01 RX ADMIN — MIDAZOLAM HYDROCHLORIDE 1 MG: 1 INJECTION, SOLUTION INTRAMUSCULAR; INTRAVENOUS at 08:41

## 2018-11-01 RX ADMIN — LIDOCAINE HYDROCHLORIDE 17 ML: 10 INJECTION, SOLUTION EPIDURAL; INFILTRATION; INTRACAUDAL; PERINEURAL at 09:01

## 2018-11-01 RX ADMIN — MIDAZOLAM HYDROCHLORIDE 0.5 MG: 1 INJECTION, SOLUTION INTRAMUSCULAR; INTRAVENOUS at 09:01

## 2018-11-01 NOTE — IR NOTE
Interventional Radiology Intra-procedural Nursing Note    Patient Name: Liliam Galvan  Medical Record Number: [id number]  Today's Date: November 1, 2018    Start Time: 0844  End of procedure time: 0911  Procedure: port removal  Report given to: care suites RN Jessica  Time pt departs:  0920  : n/a    Other Notes: pt tolerated procedure well. No complications. Vss. On room air with reg respirations. 2mg Versed and 100mcg Fentanyl given for sedation. Right chest wall port removal site c/d/i     Melissa De Anda RN

## 2018-11-01 NOTE — PROGRESS NOTES
Patient returned from IR. Very sleepy but easily aroused.  back to room. Removal site clean and dry with skin glue application. No other dressing. Taking fluids. Will monitor until awake.

## 2018-11-01 NOTE — IP AVS SNAPSHOT
MRN:[id number]                      After Visit Summary   11/1/2018    Liliam Galvan    MRN: [id number]           Visit Information        Department      11/1/2018  7:13 AM [facility]          Review of your medicines      UNREVIEWED medicines. Ask your doctor about these medicines        Dose / Directions    HYDROcodone-acetaminophen 5-325 MG per tablet   Commonly known as:  NORCO   Used for:  Mass of right side of neck        Dose:  1 tablet   Take 1 tablet by mouth every 6 hours as needed for other (Moderate to Severe Pain)   Quantity:  6 tablet   Refills:  0       hydrocortisone 1 % cream   Commonly known as:  CORTAID   Used for:  Rash of neck        APPLY SPARINGLY EXTERNALLY TO THE AFFECTED AREA THREE TIMES DAILY FOR 14 DAYS   Quantity:  28.35 g   Refills:  0       ibuprofen 200 MG capsule   Used for:  Mass of right side of neck        Dose:  600 mg   Take 600 mg by mouth every 4 hours as needed OTC dose unknown   Quantity:  120 capsule   Refills:  0                Protect others around you: Learn how to safely use, store and throw away your medicines at www.disposemymeds.org.         Follow-ups after your visit        Your next 10 appointments already scheduled     Nov 01, 2018  9:00 AM CDT   (Arrive by 7:30 AM)   IR PORT REMOVAL RIGHT with SHIR1   [facility] ([facility])    [address] [place] [id number] [phone]           The day before the exam:   You may eat your regular diet.   You are encouraged to drink at least 8 eight ounce glasses of clear liquids.  Please wear loose clothing, such as a sweat suit or jogging clothes. Avoid snaps, zippers and other metal. We may ask you to undress and put on a hospital gown.  Please bring any scans or X-rays taken at other hospitals, if similar tests were done. Also bring a list of your medicines, including vitamins, minerals and over-the-counter drugs. It  is safest to leave personal items at home.  Someone will need to drive you to and from the hospital.  Tell your doctor in advance:   If you have allergies to x-ray contrast or iodine.   If you are or may be pregnant.   If you are taking Coumadin (or any other blood thinners) 5 days prior to the exam for any special instructions.   If you are diabetic to determine if your insulin needs have to be adjusted for the exam.  Your doctor will:   Need to do a history and physical within 30 days before this procedure.   Obtain necessary laboratory tests prior to the exam (Basic Metabolic Panel, CBCP, PTT and INR)   (No labs needed if you are having a tunneled catheter exchange or removal)  If you were given sedation, you cannot drive for 24 hours after the procedure, and an adult must be with you until then.  If you have any questions, please call the Imaging Department where you will have your exam. Directions, parking instructions, and other information are available on our website, [email or web address].               Care Instructions        Further instructions from your care team         Port Removal Discharge Instructions     After you go home:      Have an adult stay with you for the first 6 hours    You may resume your normal diet       For 24 hours - due to the sedation you received:    Relax and take it easy    Do NOT make any important or legal decisions    Do NOT drive or operate machines at home or at work    Do NOT drink alcohol    Care of Puncture Site:      Keep the dressings on your site clean & dry for 3 days. Change it only if it gets wet or dirty.    You may shower after the dressing comes off in 3 days    Do not remove the small white strips of tape, if present. Allow them to fall off on their own.     You may cover the wound with a bandaid after the dressing is removed if needed for comfort      Activity       Avoid heavy lifting (greater than 10 pounds) or the overuse of your shoulder for 3  days    Bleeding:      If you start bleeding from the incision site in your chest or have swelling in your neck, sit down and press on the site for 5-10 minutes.     If bleeding has not stopped after 10 minutes, call your provider.        Call 911 right away if you have heavy bleeding or bleeding that does not stop.      Medicines:      You may resume all medications    For minor pain, you may take Acetaminophen (Tylenol) or Ibuprofen (Advil)          Call the provider who ordered this procedure if:      You have swelling in your neck or over your port site    The incision area is red, swollen, hot or tender    You have chills or a fever greater than 101 F (38 C)    Any questions or concerns    Call  911 or go to the Emergency Room if you have:      Severe chest pain or trouble breathing    Bleeding that you cannot control    If you have questions call:          [facility] Radiology Dept @ [phone]    The provider who performed your procedure was Dr Ramos.     Additional Information About Your Visit        [employer] Information     [employer] gives you secure access to your electronic health record. If you see a primary care provider, you can also send messages to your care team and make appointments. If you have questions, please call your primary care clinic.  If you do not have a primary care provider, please call [phone] and they will assist you.        Care EveryWhere ID     This is your Care EveryWhere ID. This could be used by other organizations to access your [facility] medical records  [id number]        Your Vitals Were     Blood Pressure Pulse Temperature Respirations Pulse Oximetry       97/65 (BP Location: Left arm) 81 96.8  F (36  C) (Oral) 18 98%        Primary Care Provider Office Phone # Fax #    [facility] [place] [phone]      Equal Access to Services     PALOMO RIVERO AH: Carlota Malagon, neymar juarez, annabelle bravo  huy moranskylarjacqueline perez'aakrzysztof ah. So [facility] [phone].    ATENCIÓN: Si habla shelli, tiene a arizmendi disposición servicios gratuitos de asistencia lingüística. Rd al [phone].    We comply with applicable federal civil rights laws and [place] laws. We do not discriminate on the basis of race, color, national origin, age, disability, sex, sexual orientation, or gender identity.            Thank you!     Thank you for choosing [facility] for your care. Our goal is always to provide you with excellent care. Hearing back from our patients is one way we can continue to improve our services. Please take a few minutes to complete the written survey that you may receive in the mail after you visit with us. Thank you!             Medication List: This is a list of all your medications and when to take them. Check marks below indicate your daily home schedule. Keep this list as a reference.      Medications           Morning Afternoon Evening Bedtime As Needed    HYDROcodone-acetaminophen 5-325 MG per tablet   Commonly known as:  NORCO   Take 1 tablet by mouth every 6 hours as needed for other (Moderate to Severe Pain)                                hydrocortisone 1 % cream   Commonly known as:  CORTAID   APPLY SPARINGLY EXTERNALLY TO THE AFFECTED AREA THREE TIMES DAILY FOR 14 DAYS                                ibuprofen 200 MG capsule   Take 600 mg by mouth every 4 hours as needed OTC dose unknown

## 2018-11-01 NOTE — PROGRESS NOTES
Awake and stable. Taking food and fluids. Denies pain. Incision clean and dry. No swelling. Discharge instructions reviewed. Discharge in medically stable condition to  via wheelchair.

## 2018-11-01 NOTE — PROCEDURES
PORT REMOVAL 11/1/2018 9:16 AM    HISTORY:  Completed chemotherapy, port no longer needed    COMPARISON:    10/27/2017    DESCRIPTION OF PROCEDURE:    After obtaining informed consent, the patient was placed in a supine position on the fluoroscopy table. The skin overlying the port was prepped and draped in the usual sterile manner. 1% lidocaine was injected for local anesthesia. An incision was made over the previous port incision. The port was then dissected out of the pocket. The port and its catheter were removed completely. The pocket was washed with antibiotic laden saline. A portion of the fibrous capsule which had formed around the port was also removed. The port incision was closed with 3 deep interrupted stitches using 3-0 Vicryl. This was supplemented with Dermabond and Steri-Strips.    A fluoroscopic image was saved before and after port removal to document that all portions of the port and catheter had been removed completely.    The patient tolerated the procedure well. There were no immediate postprocedure complications. The patient's vital signs were monitored by radiology nursing staff under my supervision and remained stable throughout the study.    MEDICATIONS:    2 mg Versed, 100 mg fentanyl    FLUOROSCOPY TIME: 0.1 minute, radiation dose: 0.19 mg    SEDATION TIME: 27 minutes    IMPRESSION:    Port removed as above.

## 2018-11-01 NOTE — PROGRESS NOTES
Patient for port-a-cath removal. Procedure explained, Dr Ramos here to discuss and consent. Labs back. Readied for procedure.

## 2018-11-01 NOTE — DISCHARGE INSTRUCTIONS
Port Removal Discharge Instructions     After you go home:      Have an adult stay with you for the first 6 hours    You may resume your normal diet       For 24 hours - due to the sedation you received:    Relax and take it easy    Do NOT make any important or legal decisions    Do NOT drive or operate machines at home or at work    Do NOT drink alcohol    Care of Puncture Site:      Keep the dressings on your site clean & dry for 3 days. Change it only if it gets wet or dirty.    You may shower after the dressing comes off in 3 days    Do not remove the small white strips of tape, if present. Allow them to fall off on their own.     You may cover the wound with a bandaid after the dressing is removed if needed for comfort      Activity       Avoid heavy lifting (greater than 10 pounds) or the overuse of your shoulder for 3 days    Bleeding:      If you start bleeding from the incision site in your chest or have swelling in your neck, sit down and press on the site for 5-10 minutes.     If bleeding has not stopped after 10 minutes, call your provider.        Call 911 right away if you have heavy bleeding or bleeding that does not stop.      Medicines:      You may resume all medications    For minor pain, you may take Acetaminophen (Tylenol) or Ibuprofen (Advil)          Call the provider who ordered this procedure if:      You have swelling in your neck or over your port site    The incision area is red, swollen, hot or tender    You have chills or a fever greater than 101 F (38 C)    Any questions or concerns    Call  911 or go to the Emergency Room if you have:      Severe chest pain or trouble breathing    Bleeding that you cannot control    If you have questions call:          [facility] Radiology Dept @ [phone]    The provider who performed your procedure was Dr Ramos.

## 2018-11-01 NOTE — IP AVS SNAPSHOT
[place] [address]    [place] [address]    Phone:  [phone]                                       After Visit Summary   11/1/2018    Liliam Galvan    MRN: [id number]           After Visit Summary Signature Page     I have received my discharge instructions, and my questions have been answered. I have discussed any challenges I see with this plan with the nurse or doctor.    ..........................................................................................................................................  Patient/Patient Representative Signature      ..........................................................................................................................................  Patient Representative Print Name and Relationship to Patient    ..................................................               ................................................  Date                                   Time    ..........................................................................................................................................  Reviewed by Signature/Title    ...................................................              ..............................................  Date                                               Time          22EPIC Rev 08/18

## 2019-03-05 ENCOUNTER — TRANSFERRED RECORDS (OUTPATIENT)
Dept: HEALTH INFORMATION MANAGEMENT | Facility: CLINIC | Age: 34
End: 2019-03-05

## 2019-04-08 ENCOUNTER — OFFICE VISIT (OUTPATIENT)
Dept: FAMILY MEDICINE | Facility: CLINIC | Age: 34
End: 2019-04-08
Payer: COMMERCIAL

## 2019-04-08 ENCOUNTER — NURSE TRIAGE (OUTPATIENT)
Dept: INTERNAL MEDICINE | Facility: CLINIC | Age: 34
End: 2019-04-08

## 2019-04-08 VITALS
BODY MASS INDEX: 29.81 KG/M2 | HEART RATE: 82 BPM | WEIGHT: 163 LBS | RESPIRATION RATE: 16 BRPM | TEMPERATURE: 98.2 F | OXYGEN SATURATION: 100 % | SYSTOLIC BLOOD PRESSURE: 123 MMHG | DIASTOLIC BLOOD PRESSURE: 81 MMHG

## 2019-04-08 DIAGNOSIS — N92.6 MISSED MENSES: Primary | ICD-10-CM

## 2019-04-08 DIAGNOSIS — Z32.01 PREGNANCY TEST POSITIVE: ICD-10-CM

## 2019-04-08 LAB — HCG UR QL: POSITIVE

## 2019-04-08 PROCEDURE — 99213 OFFICE O/P EST LOW 20 MIN: CPT | Performed by: PHYSICIAN ASSISTANT

## 2019-04-08 PROCEDURE — 81025 URINE PREGNANCY TEST: CPT | Performed by: PHYSICIAN ASSISTANT

## 2019-04-08 RX ORDER — CHOLECALCIFEROL (VITAMIN D3) 1250 MCG
CAPSULE ORAL
Refills: 3 | COMMUNITY
Start: 2019-03-25

## 2019-04-08 NOTE — PROGRESS NOTES
"  SUBJECTIVE:                                                    Liliam Galvan is a 34 year old female who presents to clinic today for the following health issues:      Dizziness  Onset: ***    Description:   Do you feel faint:  { :037772}  Does it feel like the surroundings (bed, room) are moving: { :032858}  Unsteady/off balance: { :004265}  Have you passed out or fallen: { :626703}    Intensity: {.:948615}    Progression of Symptoms:  {.:519058}    Accompanying Signs & Symptoms:  Heart palpitations: { :014464}  Nausea, vomiting: { :908335}  Weakness in arms or legs: { :551674}  Fatigue: { :315730}  Vision or speech changes: { :087476}  Ringing in ears (Tinnitus): { :079523}  Hearing Loss: { :928993}    History:   Head trauma/concussion hx: { :294064}  Previous similar symptoms: { :083324}  Recent bleeding history: { :383821}    Precipitating factors:   Worse with activity or head movement: { :196376}  Any new medications (BP?): { :071587}  Alcohol/drug abuse/withdrawal: { :672894}    Alleviating factors:   Does staying in a fixed position give relief:  { :099529}    Therapies Tried and outcome: ***    {additional problems for provider to add:867219}    Problem list and histories reviewed & adjusted, as indicated.  Additional history: {NONE - AS DOCUMENTED:781432::\"as documented\"}    {HIST REVIEW/ LINKS 2:636682}    {PROVIDER CHARTING PREFERENCE:204282}        "

## 2019-04-08 NOTE — TELEPHONE ENCOUNTER
"    Reason for Disposition    [1] MILD dizziness (e.g., walking normally) AND [2] has NOT been evaluated by physician for this  (Exception: dizziness caused by heat exposure, sudden standing, or poor fluid intake)     Recommended appointment today for evaluation    Additional Information    Negative: Severe difficulty breathing (e.g., struggling for each breath, speaks in single words)    Negative: [1] Difficulty breathing or swallowing AND [2] started suddenly after medicine, an allergic food or bee sting    Negative: Shock suspected (e.g., cold/pale/clammy skin, too weak to stand, low BP, rapid pulse)    Negative: Difficult to awaken or acting confused  (e.g., disoriented, slurred speech)    Negative: [1] Weakness (i.e., paralysis, loss of muscle strength) of the face, arm or leg on one side of the body AND [2] sudden onset AND [3] present now    Negative: [1] Numbness (i.e., loss of sensation) of the face, arm or leg on one side of the body AND [2] sudden onset AND [3] present now    Negative: [1] Loss of speech or garbled speech AND [2] sudden onset AND [3] present now    Negative: Overdose (accidental or intentional) of medications    Negative: [1] Fainted > 15 minutes ago AND [2] still feels too weak or dizzy to stand    Negative: Heart beating < 50 beats per minute OR > 140 beats per minute    Negative: Sounds like a life-threatening emergency to the triager    Negative: Difficulty breathing    Negative: SEVERE dizziness (e.g., unable to stand, requires support to walk, feels like passing out now)    Negative: Extra heart beats OR irregular heart beating  (i.e., \"palpitations\")    Negative: [1] Drinking very little AND [2] dehydration suspected (e.g., no urine > 12 hours, very dry mouth, very lightheaded)    Negative: Patient sounds very sick or weak to the triager    Negative: [1] Dizziness caused by heat exposure, sudden standing, or poor fluid intake AND [2] no improvement after 2 hours of rest and " "fluids    Negative: [1] Fever > 103 F (39.4 C) AND [2] not able to get the fever down using Fever Care Advice    Negative: [1] Fever > 101 F (38.3 C) AND [2] age > 60    Negative: [1] Fever > 101 F (38.3 C) AND [2] bedridden (e.g., nursing home patient, CVA, chronic illness, recovering from surgery)    Negative: [1] Fever > 100.5 F (38.1 C) AND [2] diabetes mellitus or weak immune system (e.g., HIV positive, cancer chemo, splenectomy, organ transplant, chronic steroids)    Negative: [1] MODERATE dizziness (e.g., interferes with normal activities) AND [2] has NOT been evaluated by physician for this  (Exception: dizziness caused by heat exposure, sudden standing, or poor fluid intake)    Negative: Fever present > 3 days (72 hours)    Negative: Taking a medicine that could cause dizziness (e.g., blood pressure medications, diuretics)    Negative: [1] MODERATE dizziness (e.g., interferes with normal activities) AND [2] has been evaluated by physician for this    Answer Assessment - Initial Assessment Questions  1. DESCRIPTION: \"Describe your dizziness.\"      Feels tired and lightheaded  2. LIGHTHEADED: \"Do you feel lightheaded?\" (e.g., somewhat faint, woozy, weak upon standing)      yes  3. VERTIGO: \"Do you feel like either you or the room is spinning or tilting?\" (i.e. vertigo)      no  4. SEVERITY: \"How bad is it?\"  \"Do you feel like you are going to faint?\" \"Can you stand and walk?\"    - MILD - walking normally    - MODERATE - interferes with normal activities (e.g., work, school)     - SEVERE - unable to stand, requires support to walk, feels like passing out now.       Mild - able to walk, but gets very tired  5. ONSET:  \"When did the dizziness begin?\"      1 week ago  6. AGGRAVATING FACTORS: \"Does anything make it worse?\" (e.g., standing, change in head position)      Nothing triggering  7. HEART RATE: \"Can you tell me your heart rate?\" \"How many beats in 15 seconds?\"  (Note: not all patients can do this)        " "n/a  8. CAUSE: \"What do you think is causing the dizziness?\"      unsure  9. RECURRENT SYMPTOM: \"Have you had dizziness before?\" If so, ask: \"When was the last time?\" \"What happened that time?\"      no  10. OTHER SYMPTOMS: \"Do you have any other symptoms?\" (e.g., fever, chest pain, vomiting, diarrhea, bleeding)        Nauseated, maybe short of breat at times, intermittent fatigued  11. PREGNANCY: \"Is there any chance you are pregnant?\" \"When was your last menstrual period?\"        Unsure, LMP 2/25/19, but periods are not regular    Protocols used: DIZZINESS - LIGHTHEADEDNESS-ADULT-AH      "

## 2019-04-08 NOTE — PATIENT INSTRUCTIONS
Patient Education     Pregnancy After Age 35  It s a myth that being 35 or older means your pregnancy will be high risk. However, women with pre-existing health problems may be at risk for complications during pregnancy. Making the right choices now and working with your healthcare provider can help make your pregnancy healthy for both you and your baby.  Things to think about  Most women who are 35 or older have normal pregnancies, but there are some things to think about before getting pregnant. Once a woman reaches 35, she has a greater chance for:    Problems getting pregnant    Miscarriage    Ectopic pregnancy (a pregnancy located outside the uterus)     Stillbirth    Diabetes or high blood pressure while pregnant    Being tired all the time when pregnant     section (surgery to deliver a baby)    Having babies with genetic problems, like Down syndrome    Being pregnant with two or more babies    Premature baby    Death  Making the right choices  Before and after you become pregnant:    Don t use recreational drugs.    Don t drink alcohol.    Don t smoke.  Keeping you and your baby healthy  Before and during your pregnancy:    Take 400 to 800 micrograms of folic acid.    Stay physically active.    Keep a healthy weight.    Avoid contact with harmful substances in your home or workplace.  You may need extra care if you have any of the following:    Sexually transmitted diseases (STDs)    Diabetes    High blood pressure    Other chronic health problems  Special healthcare  Fertility counseling. As women age, getting pregnant can get more difficult. Ask your healthcare provider how long you should try to get pregnant before seeking help from a specialist.  Genetic counseling. Genetic counseling evaluates the risk for birth defects in your baby. You will be asked detailed questions about your family health history. You may also have medical tests.  Amniocentesis. This test studies amniotic fluid (liquid  that surrounds the fetus in the womb). It can help diagnose birth defects and other medical problems.   Date Last Reviewed: 10/1/2017    5455-1249 [employer] [address] [place] [address], [place] [address]. All rights reserved. This information is not intended as a substitute for professional medical care. Always follow your healthcare professional's instructions.

## 2019-04-08 NOTE — PROGRESS NOTES
SUBJECTIVE:                                                    Liliam Galvan is a 34 year old female who presents to clinic today for the following health issues:      Concern - Fatigue and lightheaded   Onset: 2 weeks     Description:   Feeling tired and lightheaded     Intensity: moderate    Progression of Symptoms:      Accompanying Signs & Symptoms:  Nausea     Previous history of similar problem:   Low Vitamin D     Precipitating factors:   Worsened by:     Alleviating factors:  Improved by: none    Therapies Tried and outcome: none      Googled her symptoms and they were symptoms of pregnancy. She did not have these symptoms when she was pregnant before. Missed her period but has irregular periods normally. Has been fasting intermittently. Drinks lots of water. LMP 2/25/19. Not preventing pregnancy but not trying.         Problem list and histories reviewed & adjusted, as indicated.  Additional history: as documented    Patient Active Problem List   Diagnosis     CARDIOVASCULAR SCREENING; LDL GOAL LESS THAN 160     PCOS (polycystic ovarian syndrome)     LAD (lymphadenopathy), mediastinal     Past Surgical History:   Procedure Laterality Date     BIOPSY MASS NECK Right 10/6/2017    Procedure: BIOPSY MASS NECK;  BIOPSY RIGHT NECK MASS; EXCISION OF RIGHT CERVICAL LYMPH NODES;  Surgeon: Roberto Jonas MD;  Location:  OR     BONE MARROW BIOPSY, BONE SPECIMEN, NEEDLE/TROCAR N/A 10/16/2017    Procedure: BIOPSY BONE MARROW;  UNILATERAL BONE MARROW BIOPSY (CONSCIOUS SEDATION) ;  Surgeon: Yang Martini MD;  Location:  GI     NO HISTORY OF SURGERY         Social History     Tobacco Use     Smoking status: Never Smoker     Smokeless tobacco: Never Used   Substance Use Topics     Alcohol use: No     Alcohol/week: 0.0 oz     Family History   Problem Relation Age of Onset     Family History Negative Mother      Cerebrovascular Disease Paternal Grandfather      Diabetes No family hx of       Coronary Artery Disease No family hx of      Hypertension No family hx of      Hyperlipidemia No family hx of      Breast Cancer No family hx of      Cancer - colorectal No family hx of      Ovarian Cancer No family hx of      Prostate Cancer No family hx of      Depression/Anxiety No family hx of      Anesthesia Reaction No family hx of      Thyroid Disease No family hx of      Asthma No family hx of      Osteoporosis No family hx of      Chemical Addiction No family hx of      Known Genetic Syndrome No family hx of          Current Outpatient Medications   Medication Sig Dispense Refill     cholecalciferol (VITAMIN D3) 64979 units (1250 mcg) capsule TK ONE C PO WEEKLY  3     ibuprofen 200 MG capsule Take 600 mg by mouth every 4 hours as needed OTC dose unknown (Patient not taking: Reported on 4/8/2019) 120 capsule      Allergies   Allergen Reactions     Nkda [No Known Drug Allergies]        ROS:  Constitutional, HEENT, cardiovascular, pulmonary, gi and gu systems are negative, except as otherwise noted.    OBJECTIVE:     /81 (BP Location: Right arm, Patient Position: Chair, Cuff Size: Adult Regular)   Pulse 82   Temp 98.2  F (36.8  C) (Oral)   Resp 16   Wt 73.9 kg (163 lb)   LMP 02/25/2019   SpO2 100%   BMI 29.81 kg/m    Body mass index is 29.81 kg/m .  GENERAL: healthy, alert and no distress  EYES: Eyes grossly normal to inspection, PERRL and conjunctivae and sclerae normal  RESP: lungs clear to auscultation - no rales, rhonchi or wheezes  CV: regular rate and rhythm, normal S1 S2, no S3 or S4, no murmur, click or rub, no peripheral edema and peripheral pulses strong  ABDOMEN: soft, nontender, no hepatosplenomegaly, no masses and bowel sounds normal  MS: no gross musculoskeletal defects noted, no edema  SKIN: no suspicious lesions or rashes  NEURO: Normal strength and tone, mentation intact and speech normal  PSYCH: mentation appears normal, affect normal/bright    Diagnostic Test Results:  Results  for orders placed or performed in visit on 19 (from the past 24 hour(s))   HCG Qual, Urine ([id number])   Result Value Ref Range    HCG Qual Urine Positive (A) NEG^Negative       ASSESSMENT/PLAN:       (N92.6) Missed menses  (primary encounter diagnosis)    Comment: Pregnancy test positive.    Plan: HCG Qual, Urine ([id number])            (Z32.01) Pregnancy test positive    Comment: Pregnancy wheel places her at 6 weeks, 0 days. She will follow-up with her OBGYN in [place]. Encouraged her to go back to eating regular meals instead of fasting.    Plan: See above.      Patient Instructions       Patient Education     Pregnancy After Age 35  It s a myth that being 35 or older means your pregnancy will be high risk. However, women with pre-existing health problems may be at risk for complications during pregnancy. Making the right choices now and working with your healthcare provider can help make your pregnancy healthy for both you and your baby.  Things to think about  Most women who are 35 or older have normal pregnancies, but there are some things to think about before getting pregnant. Once a woman reaches 35, she has a greater chance for:    Problems getting pregnant    Miscarriage    Ectopic pregnancy (a pregnancy located outside the uterus)     Stillbirth    Diabetes or high blood pressure while pregnant    Being tired all the time when pregnant     section (surgery to deliver a baby)    Having babies with genetic problems, like Down syndrome    Being pregnant with two or more babies    Premature baby    Death  Making the right choices  Before and after you become pregnant:    Don t use recreational drugs.    Don t drink alcohol.    Don t smoke.  Keeping you and your baby healthy  Before and during your pregnancy:    Take 400 to 800 micrograms of folic acid.    Stay physically active.    Keep a healthy weight.    Avoid contact with harmful substances in your home or workplace.  You may need extra care  if you have any of the following:    Sexually transmitted diseases (STDs)    Diabetes    High blood pressure    Other chronic health problems  Special healthcare  Fertility counseling. As women age, getting pregnant can get more difficult. Ask your healthcare provider how long you should try to get pregnant before seeking help from a specialist.  Genetic counseling. Genetic counseling evaluates the risk for birth defects in your baby. You will be asked detailed questions about your family health history. You may also have medical tests.  Amniocentesis. This test studies amniotic fluid (liquid that surrounds the fetus in the womb). It can help diagnose birth defects and other medical problems.   Date Last Reviewed: 10/1/2017    5846-0110 [employer]. [address] [place] [address], [place] [address]. All rights reserved. This information is not intended as a substitute for professional medical care. Always follow your healthcare professional's instructions.               Abdirizak Pichardo PA-C  [facility]

## 2019-04-19 ENCOUNTER — HEALTH MAINTENANCE LETTER (OUTPATIENT)
Age: 34
End: 2019-04-19

## 2019-05-21 ENCOUNTER — TRANSFERRED RECORDS (OUTPATIENT)
Dept: HEALTH INFORMATION MANAGEMENT | Facility: CLINIC | Age: 34
End: 2019-05-21

## 2019-08-01 ENCOUNTER — TRANSFERRED RECORDS (OUTPATIENT)
Dept: HEALTH INFORMATION MANAGEMENT | Facility: CLINIC | Age: 34
End: 2019-08-01

## 2019-09-27 ENCOUNTER — OFFICE VISIT (OUTPATIENT)
Dept: INTERNAL MEDICINE | Facility: CLINIC | Age: 34
End: 2019-09-27
Payer: COMMERCIAL

## 2019-09-27 VITALS
TEMPERATURE: 98.1 F | OXYGEN SATURATION: 99 % | BODY MASS INDEX: 26.8 KG/M2 | DIASTOLIC BLOOD PRESSURE: 80 MMHG | SYSTOLIC BLOOD PRESSURE: 122 MMHG | HEIGHT: 64 IN | HEART RATE: 99 BPM | RESPIRATION RATE: 18 BRPM | WEIGHT: 157 LBS

## 2019-09-27 DIAGNOSIS — C81.11 NODULAR SCLEROSIS HODGKIN LYMPHOMA OF LYMPH NODES OF NECK (H): ICD-10-CM

## 2019-09-27 DIAGNOSIS — Z00.00 ROUTINE GENERAL MEDICAL EXAMINATION AT A HEALTH CARE FACILITY: Primary | ICD-10-CM

## 2019-09-27 LAB
ALBUMIN SERPL-MCNC: 3.8 G/DL (ref 3.4–5)
ALP SERPL-CCNC: 84 U/L (ref 40–150)
ALT SERPL W P-5'-P-CCNC: 23 U/L (ref 0–50)
ANION GAP SERPL CALCULATED.3IONS-SCNC: 6 MMOL/L (ref 3–14)
AST SERPL W P-5'-P-CCNC: 12 U/L (ref 0–45)
BILIRUB SERPL-MCNC: 0.4 MG/DL (ref 0.2–1.3)
BUN SERPL-MCNC: 9 MG/DL (ref 7–30)
CALCIUM SERPL-MCNC: 9 MG/DL (ref 8.5–10.1)
CHLORIDE SERPL-SCNC: 106 MMOL/L (ref 94–109)
CHOLEST SERPL-MCNC: 161 MG/DL
CO2 SERPL-SCNC: 25 MMOL/L (ref 20–32)
CREAT SERPL-MCNC: 0.45 MG/DL (ref 0.52–1.04)
GFR SERPL CREATININE-BSD FRML MDRD: >90 ML/MIN/{1.73_M2}
GLUCOSE SERPL-MCNC: 77 MG/DL (ref 70–99)
HDLC SERPL-MCNC: 40 MG/DL
LDLC SERPL CALC-MCNC: 108 MG/DL
NONHDLC SERPL-MCNC: 121 MG/DL
POTASSIUM SERPL-SCNC: 3.6 MMOL/L (ref 3.4–5.3)
PROT SERPL-MCNC: 7.6 G/DL (ref 6.8–8.8)
SODIUM SERPL-SCNC: 137 MMOL/L (ref 133–144)
TRIGL SERPL-MCNC: 66 MG/DL
TSH SERPL DL<=0.005 MIU/L-ACNC: 3.1 MU/L (ref 0.4–4)

## 2019-09-27 PROCEDURE — 80061 LIPID PANEL: CPT | Performed by: INTERNAL MEDICINE

## 2019-09-27 PROCEDURE — 99395 PREV VISIT EST AGE 18-39: CPT | Performed by: INTERNAL MEDICINE

## 2019-09-27 PROCEDURE — 80053 COMPREHEN METABOLIC PANEL: CPT | Performed by: INTERNAL MEDICINE

## 2019-09-27 PROCEDURE — 36415 COLL VENOUS BLD VENIPUNCTURE: CPT | Performed by: INTERNAL MEDICINE

## 2019-09-27 PROCEDURE — 84443 ASSAY THYROID STIM HORMONE: CPT | Performed by: INTERNAL MEDICINE

## 2019-09-27 ASSESSMENT — ENCOUNTER SYMPTOMS
HEMATOCHEZIA: 0
FREQUENCY: 0
DIZZINESS: 0
FEVER: 0
DIARRHEA: 0
CHILLS: 0
COUGH: 0
ABDOMINAL PAIN: 0
EYE PAIN: 0
NERVOUS/ANXIOUS: 0
CONSTIPATION: 0
HEMATURIA: 0

## 2019-09-27 ASSESSMENT — MIFFLIN-ST. JEOR: SCORE: 1379.21

## 2019-09-27 NOTE — NURSING NOTE
"/80 (BP Location: Left arm, Patient Position: Sitting, Cuff Size: Adult Regular)   Pulse 99   Temp 98.1  F (36.7  C) (Oral)   Resp 18   Ht 1.613 m (5' 3.5\")   Wt 71.2 kg (157 lb)   LMP 09/15/2019 (Approximate)   SpO2 99%   BMI 27.38 kg/m    Patient is here for a physical and is fasting.  "

## 2019-09-27 NOTE — PROGRESS NOTES
SUBJECTIVE:   CC: Liliam Galvan is an 34 year old woman who presents for preventive health visit.     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 She follows up with [facility]. Notes that she gets three month visit and yearly scans.   A CT scan of the neck was taken on 9/25/2019.  IMPRESSION:  1. Massive lymphadenopathy of the mediastinum and right neck up to the  level of the hyoid bone as well as mild lymphadenopathy in the  supraclavicular left neck. While lymphatic malignancy must certainly  be considered, an infectious etiology such as tuberculosis is also  quite possible.  2. Mass effect with leftward deviation of the aerodigestive tract from  the larynx down to the upper thoracic trachea without significant  narrowing of the airway.  3. Otherwise, normal soft tissue neck CT.    Pt reports that the cancer is resolved.    Today's PHQ-2 Score:   PHQ-2 ( 1999 Pfizer) 9/27/2019   Q1: Little interest or pleasure in doing things 0   Q2: Feeling down, depressed or hopeless 0   PHQ-2 Score 0   Q1: Little interest or pleasure in doing things Not at all   Q2: Feeling down, depressed or hopeless Not at all   PHQ-2 Score 0     Abuse: Current or Past(Physical, Sexual or Emotional)- No  Do you feel safe in your environment? Yes    Social History     Tobacco Use     Smoking status: Never Smoker     Smokeless tobacco: Never Used   Substance Use Topics     Alcohol use: No     Alcohol/week: 0.0 standard drinks  "      Alcohol Use 9/27/2019   Prescreen: >3 drinks/day or >7 drinks/week? No     Reviewed orders with patient.  Reviewed health maintenance and updated orders accordingly - Yes    Mammogram not appropriate for this patient based on age.    Pertinent mammograms are reviewed under the imaging tab.  History of abnormal Pap smear: NO - age 30-65 PAP every 5 years with negative HPV co-testing recommended  PAP / HPV Latest Ref Rng & Units 1/25/2016 4/19/2013 2/9/2012   PAP - NIL NIL NIL   HPV 16 DNA NEG Negative - -   HPV 18 DNA NEG Negative - -   OTHER HR HPV NEG Negative - -     Reviewed and updated as needed this visit by clinical staff  Tobacco  Allergies  Meds  Med Hx  Surg Hx  Fam Hx  Soc Hx        Reviewed and updated as needed this visit by Provider  Allergies  Meds        Review of Systems   Constitutional: Negative for chills and fever.   HENT: Negative for congestion and ear pain.    Eyes: Negative for pain.   Respiratory: Negative for cough.    Cardiovascular: Negative for chest pain.   Gastrointestinal: Negative for abdominal pain, constipation, diarrhea and hematochezia.   Genitourinary: Negative for frequency and hematuria.   Neurological: Negative for dizziness.   Psychiatric/Behavioral: The patient is not nervous/anxious.      This document serves as a record of the services and decisions personally performed and made by Latricia Vasquez MD. It was created on her behalf by Maribell Arevalo, a trained medical scribe. The creation of this document is based on the provider's statements to the medical scribe.  Maribell Arevalo September 27, 2019 11:53 AM    OBJECTIVE:   /80 (BP Location: Left arm, Patient Position: Sitting, Cuff Size: Adult Regular)   Pulse 99   Temp 98.1  F (36.7  C) (Oral)   Resp 18   Ht 1.613 m (5' 3.5\")   Wt 71.2 kg (157 lb)   LMP 09/15/2019 (Approximate)   SpO2 99%   BMI 27.38 kg/m    Physical Exam  GENERAL: healthy, alert and no distress  EYES: Eyes grossly normal to " "inspection, PERRL and conjunctivae and sclerae normal  HENT: ear canals and TM's normal, nose and mouth without ulcers or lesions  NECK: no adenopathy, no asymmetry, masses, or scars and thyroid normal to palpation  RESP: lungs clear to auscultation - no rales, rhonchi or wheezes  BREAST: normal without masses, tenderness or nipple discharge and no palpable axillary masses or adenopathy  CV: regular rate and rhythm, normal S1 S2, no S3 or S4, no murmur, click or rub, no peripheral edema and peripheral pulses strong  ABDOMEN: soft, nontender, no hepatosplenomegaly, no masses and bowel sounds normal  MS: no gross musculoskeletal defects noted, no edema  SKIN: no suspicious lesions or rashes  NEURO: Normal strength and tone, mentation intact and speech normal  PSYCH: mentation appears normal, affect normal/bright    Diagnostic Test Results:  Labs reviewed in Epic  No results found for this or any previous visit (from the past 24 hour(s)).    ASSESSMENT/PLAN:   (Z00.00) Routine general medical examination at a health care facility  (primary encounter diagnosis)  Comment: Normal physical.   Plan: Comprehensive metabolic panel, Lipid panel         reflex to direct LDL Fasting, TSH with free T4         reflex        Will be doing fasting labs.     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  Plan: Pt follow up with [facility].     COUNSELING:  Reviewed preventive health counseling, as reflected in patient instructions       Regular exercise       Healthy diet/nutrition       Vision screening    Estimated body mass index is 27.38 kg/m  as calculated from the following:    Height as of this encounter: 1.613 m (5' 3.5\").    Weight as of this encounter: 71.2 kg (157 lb).    Weight management plan: Discussed healthy diet and exercise guidelines     reports that she has never smoked. She has never used smokeless tobacco.      Counseling Resources:  ATP IV Guidelines  Pooled Cohorts " Equation Calculator  Breast Cancer Risk Calculator  FRAX Risk Assessment  ICSI Preventive Guidelines  Dietary Guidelines for Americans, 2010  USDA's MyPlate  ASA Prophylaxis  Lung CA Screening    The information in this document, created by the medical scribe for me, accurately reflects the services I personally performed and the decisions made by me. I have reviewed and approved this document for accuracy.   September 27, 2019 12:02 PM   Latricia Vasquez MD  [facility]

## 2019-11-09 ENCOUNTER — HEALTH MAINTENANCE LETTER (OUTPATIENT)
Age: 34
End: 2019-11-09

## 2020-08-06 ENCOUNTER — TRANSFERRED RECORDS (OUTPATIENT)
Dept: HEALTH INFORMATION MANAGEMENT | Facility: CLINIC | Age: 35
End: 2020-08-06

## 2020-12-06 ENCOUNTER — HEALTH MAINTENANCE LETTER (OUTPATIENT)
Age: 35
End: 2020-12-06

## 2021-02-01 ENCOUNTER — TRANSFERRED RECORDS (OUTPATIENT)
Dept: HEALTH INFORMATION MANAGEMENT | Facility: CLINIC | Age: 36
End: 2021-02-01

## 2021-04-11 ENCOUNTER — HEALTH MAINTENANCE LETTER (OUTPATIENT)
Age: 36
End: 2021-04-11

## 2021-08-10 ENCOUNTER — TRANSFERRED RECORDS (OUTPATIENT)
Dept: HEALTH INFORMATION MANAGEMENT | Facility: CLINIC | Age: 36
End: 2021-08-10

## 2021-09-26 ENCOUNTER — HEALTH MAINTENANCE LETTER (OUTPATIENT)
Age: 36
End: 2021-09-26

## 2021-09-29 ENCOUNTER — OFFICE VISIT (OUTPATIENT)
Dept: INTERNAL MEDICINE | Facility: CLINIC | Age: 36
End: 2021-09-29
Payer: COMMERCIAL

## 2021-09-29 VITALS
SYSTOLIC BLOOD PRESSURE: 94 MMHG | OXYGEN SATURATION: 99 % | WEIGHT: 158.8 LBS | HEIGHT: 62 IN | TEMPERATURE: 97.2 F | HEART RATE: 93 BPM | BODY MASS INDEX: 29.22 KG/M2 | RESPIRATION RATE: 16 BRPM | DIASTOLIC BLOOD PRESSURE: 70 MMHG

## 2021-09-29 DIAGNOSIS — C81.10 NODULAR SCLEROSING HODGKIN'S LYMPHOMA, UNSPECIFIED BODY REGION (H): ICD-10-CM

## 2021-09-29 DIAGNOSIS — Z00.00 HEALTH MAINTENANCE EXAMINATION: Primary | ICD-10-CM

## 2021-09-29 LAB
DEPRECATED CALCIDIOL+CALCIFEROL SERPL-MC: 45 UG/L (ref 20–75)
ERYTHROCYTE [DISTWIDTH] IN BLOOD BY AUTOMATED COUNT: 13.5 % (ref 10–15)
HCT VFR BLD AUTO: 39.4 % (ref 35–47)
HGB BLD-MCNC: 12.9 G/DL (ref 11.7–15.7)
MCH RBC QN AUTO: 28.6 PG (ref 26.5–33)
MCHC RBC AUTO-ENTMCNC: 32.7 G/DL (ref 31.5–36.5)
MCV RBC AUTO: 87 FL (ref 78–100)
PLATELET # BLD AUTO: 263 10E3/UL (ref 150–450)
RBC # BLD AUTO: 4.51 10E6/UL (ref 3.8–5.2)
WBC # BLD AUTO: 5 10E3/UL (ref 4–11)

## 2021-09-29 PROCEDURE — 80061 LIPID PANEL: CPT | Performed by: NURSE PRACTITIONER

## 2021-09-29 PROCEDURE — 80048 BASIC METABOLIC PNL TOTAL CA: CPT | Performed by: NURSE PRACTITIONER

## 2021-09-29 PROCEDURE — 36415 COLL VENOUS BLD VENIPUNCTURE: CPT | Performed by: NURSE PRACTITIONER

## 2021-09-29 PROCEDURE — 99395 PREV VISIT EST AGE 18-39: CPT | Performed by: NURSE PRACTITIONER

## 2021-09-29 PROCEDURE — 85027 COMPLETE CBC AUTOMATED: CPT | Performed by: NURSE PRACTITIONER

## 2021-09-29 PROCEDURE — 82306 VITAMIN D 25 HYDROXY: CPT | Performed by: NURSE PRACTITIONER

## 2021-09-29 ASSESSMENT — ENCOUNTER SYMPTOMS
SHORTNESS OF BREATH: 0
JOINT SWELLING: 0
DIARRHEA: 0
FEVER: 0
HEARTBURN: 0
MYALGIAS: 0
CHILLS: 0
DIZZINESS: 0
COUGH: 0
EYE PAIN: 0
ABDOMINAL PAIN: 0
PARESTHESIAS: 0
ARTHRALGIAS: 0
DYSURIA: 0
HEMATURIA: 0
CONSTIPATION: 0
BREAST MASS: 0
FREQUENCY: 0
NAUSEA: 0
WEAKNESS: 0
NERVOUS/ANXIOUS: 0
HEADACHES: 0
SORE THROAT: 0
HEMATOCHEZIA: 0
PALPITATIONS: 0

## 2021-09-29 ASSESSMENT — MIFFLIN-ST. JEOR: SCORE: 1357.53

## 2021-09-29 NOTE — PROGRESS NOTES
SUBJECTIVE:   CC: Liliam Galvan is an 36 year old woman who presents for preventive health visit.       Patient has been advised of split billing requirements and indicates understanding: Yes  Healthy Habits:     Getting at least 3 servings of Calcium per day:  Yes    Bi-annual eye exam:  NO    Dental care twice a year:  Yes    Sleep apnea or symptoms of sleep apnea:  None    Diet:  Carbohydrate counting, Vegetarian/vegan and Gluten-free/reduced    Frequency of exercise:  None    Taking medications regularly:  No    Medication side effects:  None    PHQ-2 Total Score: 0    Additional concerns today:  No              Today's PHQ-2 Score:   PHQ-2 ( 1999 Pfizer) 9/29/2021   Q1: Little interest or pleasure in doing things 0   Q2: Feeling down, depressed or hopeless 0   PHQ-2 Score 0   Q1: Little interest or pleasure in doing things Not at all   Q2: Feeling down, depressed or hopeless Not at all   PHQ-2 Score 0       Abuse: Current or Past (Physical, Sexual or Emotional) - No  Do you feel safe in your environment? Yes    Have you ever done Advance Care Planning? (For example, a Health Directive, POLST, or a discussion with a medical provider or your loved ones about your wishes): No, advance care planning information given to patient to review.  Patient declined advance care planning discussion at this time.    Social History     Tobacco Use     Smoking status: Never Smoker     Smokeless tobacco: Never Used   Substance Use Topics     Alcohol use: No     Alcohol/week: 0.0 standard drinks     If you drink alcohol do you typically have >3 drinks per day or >7 drinks per week? No    Alcohol Use 9/29/2021   Prescreen: >3 drinks/day or >7 drinks/week? No       Reviewed orders with patient.  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 (H)     Hodgkin lymphoma, nodular sclerosis (H)     Past Surgical History:   Procedure Laterality Date     BIOPSY MASS NECK Right 10/6/2017    Procedure: BIOPSY MASS NECK;  BIOPSY RIGHT NECK MASS; EXCISION OF RIGHT CERVICAL LYMPH NODES;  Surgeon: Roberto Jonas MD;  Location:  OR     BONE MARROW BIOPSY, BONE SPECIMEN, NEEDLE/TROCAR N/A 10/16/2017    Procedure: BIOPSY BONE MARROW;  UNILATERAL BONE MARROW BIOPSY (CONSCIOUS SEDATION) ;  Surgeon: Yang Martini MD;  Location:  GI     NO HISTORY OF SURGERY         Social History     Tobacco Use     Smoking status: Never Smoker     Smokeless tobacco: Never Used   Substance Use Topics     Alcohol use: No     Alcohol/week: 0.0 standard drinks     Family History   Problem Relation Age of Onset     Family History Negative Mother      Cerebrovascular Disease Paternal Grandfather      Diabetes No family hx of      Coronary Artery Disease No family hx of      Hypertension No family hx of      Hyperlipidemia No family hx of      Breast Cancer No family hx of      Cancer - colorectal No family hx of      Ovarian Cancer No family hx of      Prostate Cancer No family hx of      Depression/Anxiety No family hx of      Anesthesia Reaction No family hx of      Thyroid Disease No family hx of      Asthma No family hx of      Osteoporosis No family hx of      Chemical Addiction No family hx of      Known Genetic Syndrome No family hx of          Current Outpatient Medications   Medication Sig Dispense Refill     cholecalciferol (VITAMIN D3) 32239 units (1250 mcg) capsule TK ONE C PO WEEKLY  3       Breast Cancer Screening:  Any new diagnosis of family breast,  "ovarian, or bowel cancer? No    FHS-7: No flowsheet data found.    Patient under 40 years of age: Routine Mammogram Screening not recommended.   Pertinent mammograms are reviewed under the imaging tab.    History of abnormal Pap smear: NO - age 30-65 PAP every 5 years with negative HPV co-testing recommended  PAP / HPV Latest Ref Rng & Units 1/25/2016 4/19/2013 2/9/2012   PAP (Historical) - NIL NIL NIL   HPV16 NEG Negative - -   HPV18 NEG Negative - -   HRHPV NEG Negative - -     Reviewed and updated as needed this visit by clinical staff  Tobacco  Allergies  Meds   Med Hx  Surg Hx  Fam Hx  Soc Hx        Reviewed and updated as needed this visit by Provider                    Review of Systems   Constitutional: Negative for chills and fever.   HENT: Negative for congestion, ear pain, hearing loss and sore throat.    Eyes: Negative for pain and visual disturbance.   Respiratory: Negative for cough and shortness of breath.    Cardiovascular: Negative for chest pain, palpitations and peripheral edema.   Gastrointestinal: Negative for abdominal pain, constipation, diarrhea, heartburn, hematochezia and nausea.   Breasts:  Negative for tenderness, breast mass and discharge.   Genitourinary: Negative for dysuria, frequency, genital sores, hematuria, pelvic pain, urgency, vaginal bleeding and vaginal discharge.   Musculoskeletal: Negative for arthralgias, joint swelling and myalgias.   Skin: Negative for rash.   Neurological: Negative for dizziness, weakness, headaches and paresthesias.   Psychiatric/Behavioral: Negative for mood changes. The patient is not nervous/anxious.           OBJECTIVE:   BP 94/70   Pulse 93   Temp 97.2  F (36.2  C) (Tympanic)   Resp 16   Ht 1.581 m (5' 2.25\")   Wt 72 kg (158 lb 12.8 oz)   LMP 09/22/2021 (Exact Date)   SpO2 99%   BMI 28.81 kg/m    Physical Exam  GENERAL: healthy, alert and no distress  EYES: Eyes grossly normal to inspection, PERRL and conjunctivae and sclerae " "normal  RESP: lungs clear to auscultation - no rales, rhonchi or wheezes  CV: regular rate and rhythm, normal S1 S2, no S3 or S4, no murmur, click or rub, no peripheral edema and peripheral pulses strong  ABDOMEN: soft, nontender, no hepatosplenomegaly, no masses and bowel sounds normal  MS: no gross musculoskeletal defects noted, no edema  NEURO: Normal strength and tone, mentation intact and speech normal  PSYCH: mentation appears normal, affect normal/bright        ASSESSMENT/PLAN:       ICD-10-CM    1. Health maintenance examination  Z00.00 CBC with platelets     Basic metabolic panel  (Ca, Cl, CO2, Creat, Gluc, K, Na, BUN)     Lipid panel reflex to direct LDL Fasting     Vitamin D Deficiency     CBC with platelets     Basic metabolic panel  (Ca, Cl, CO2, Creat, Gluc, K, Na, BUN)     Lipid panel reflex to direct LDL Fasting     Vitamin D Deficiency   2. Nodular sclerosing Hodgkin's lymphoma, unspecified body region (H)  C81.10      Schedule Pap earliest convenience  Labs pending  Yearly exams      Patient has been advised of split billing requirements and indicates understanding: Yes  COUNSELING:  Reviewed preventive health counseling, as reflected in patient instructions    Estimated body mass index is 28.81 kg/m  as calculated from the following:    Height as of this encounter: 1.581 m (5' 2.25\").    Weight as of this encounter: 72 kg (158 lb 12.8 oz).        She reports that she has never smoked. She has never used smokeless tobacco.      Counseling Resources:  ATP IV Guidelines  Pooled Cohorts Equation Calculator  Breast Cancer Risk Calculator  BRCA-Related Cancer Risk Assessment: FHS-7 Tool  FRAX Risk Assessment  ICSI Preventive Guidelines  Dietary Guidelines for Americans, 2010  USDA's MyPlate  ASA Prophylaxis  Lung CA Screening    Daly Jackson NP  [facility]  "

## 2021-09-29 NOTE — NURSING NOTE
"Physical,fasting , no pap.  Vital signs:  Temp: 97.2  F (36.2  C) Temp src: Tympanic BP: 94/70 Pulse: 93   Resp: 16 SpO2: 99 %     Height: 158.1 cm (5' 2.25\") Weight: 72 kg (158 lb 12.8 oz)  Estimated body mass index is 28.81 kg/m  as calculated from the following:    Height as of this encounter: 1.581 m (5' 2.25\").    Weight as of this encounter: 72 kg (158 lb 12.8 oz).          "

## 2021-09-29 NOTE — LETTER
October 5, 2021      Liliam Galvan  [address] [facility] [address]        Dear ,    We are writing to inform you of your test results.    Your cholesterol is borderline. I would like you to work harder on your diet for now. You will need a follow up fasting cholesterol in 6-12 months. The rest of your results are normal range.       Resulted Orders   CBC with platelets   Result Value Ref Range    WBC Count 5.0 4.0 - 11.0 10e3/uL    RBC Count 4.51 3.80 - 5.20 10e6/uL    Hemoglobin 12.9 11.7 - 15.7 g/dL    Hematocrit 39.4 35.0 - 47.0 %    MCV 87 78 - 100 fL    MCH 28.6 26.5 - 33.0 pg    MCHC 32.7 31.5 - 36.5 g/dL    RDW 13.5 10.0 - 15.0 %    Platelet Count 263 150 - 450 10e3/uL   Basic metabolic panel  (Ca, Cl, CO2, Creat, Gluc, K, Na, BUN)   Result Value Ref Range    Sodium 137 133 - 144 mmol/L    Potassium 4.0 3.4 - 5.3 mmol/L    Chloride 106 94 - 109 mmol/L    Carbon Dioxide (CO2) 26 20 - 32 mmol/L    Anion Gap 5 3 - 14 mmol/L    Urea Nitrogen 12 7 - 30 mg/dL    Creatinine 0.57 0.52 - 1.04 mg/dL    Calcium 9.2 8.5 - 10.1 mg/dL    Glucose 86 70 - 99 mg/dL    GFR Estimate >90 >60 mL/min/1.73m2      Comment:      As of July 11, 2021, eGFR is calculated by the CKD-EPI creatinine equation, without race adjustment. eGFR can be influenced by muscle mass, exercise, and diet. The reported eGFR is an estimation only and is only applicable if the renal function is stable.   Lipid panel reflex to direct LDL Fasting   Result Value Ref Range    Cholesterol 169 <200 mg/dL    Triglycerides 74 <150 mg/dL    Direct Measure HDL 43 (L) >=50 mg/dL    LDL Cholesterol Calculated 111 (H) <=100 mg/dL    Non HDL Cholesterol 126 <130 mg/dL    Patient Fasting > 8hrs? Yes     Narrative    Cholesterol  Desirable:  <200 mg/dL    Triglycerides  Normal:  Less than 150 mg/dL  Borderline High:  150-199 mg/dL  High:  200-499 mg/dL  Very High:  Greater than or equal to 500 mg/dL    Direct Measure HDL  Female:  Greater than or  equal to 50 mg/dL   Male:  Greater than or equal to 40 mg/dL    LDL Cholesterol  Desirable:  <100mg/dL  Above Desirable:  100-129 mg/dL   Borderline High:  130-159 mg/dL   High:  160-189 mg/dL   Very High:  >= 190 mg/dL    Non HDL Cholesterol  Desirable:  130 mg/dL  Above Desirable:  130-159 mg/dL  Borderline High:  160-189 mg/dL  High:  190-219 mg/dL  Very High:  Greater than or equal to 220 mg/dL   Vitamin D Deficiency   Result Value Ref Range    Vitamin D, Total (25-Hydroxy) 45 20 - 75 ug/L    Narrative    Season, race, dietary intake, and treatment affect the concentration of 25-hydroxy-Vitamin D. Values may decrease during winter months and increase during summer months. Values 20-29 ug/L may indicate Vitamin D insufficiency and values <20 ug/L may indicate Vitamin D deficiency.    Vitamin D determination is routinely performed by an immunoassay specific for 25 hydroxyvitamin D3.  If an individual is on vitamin D2(ergocalciferol) supplementation, please specify 25 OH vitamin D2 and D3 level determination by LCMSMS test [id number].         If you have any questions or concerns, please call the clinic at the number listed above.       Sincerely,      Daly Jackson NP

## 2021-09-30 LAB
ANION GAP SERPL CALCULATED.3IONS-SCNC: 5 MMOL/L (ref 3–14)
BUN SERPL-MCNC: 12 MG/DL (ref 7–30)
CALCIUM SERPL-MCNC: 9.2 MG/DL (ref 8.5–10.1)
CHLORIDE BLD-SCNC: 106 MMOL/L (ref 94–109)
CHOLEST SERPL-MCNC: 169 MG/DL
CO2 SERPL-SCNC: 26 MMOL/L (ref 20–32)
CREAT SERPL-MCNC: 0.57 MG/DL (ref 0.52–1.04)
FASTING STATUS PATIENT QL REPORTED: YES
GFR SERPL CREATININE-BSD FRML MDRD: >90 ML/MIN/1.73M2
GLUCOSE BLD-MCNC: 86 MG/DL (ref 70–99)
HDLC SERPL-MCNC: 43 MG/DL
LDLC SERPL CALC-MCNC: 111 MG/DL
NONHDLC SERPL-MCNC: 126 MG/DL
POTASSIUM BLD-SCNC: 4 MMOL/L (ref 3.4–5.3)
SODIUM SERPL-SCNC: 137 MMOL/L (ref 133–144)
TRIGL SERPL-MCNC: 74 MG/DL

## 2022-04-14 ENCOUNTER — TRANSFERRED RECORDS (OUTPATIENT)
Dept: HEALTH INFORMATION MANAGEMENT | Facility: CLINIC | Age: 37
End: 2022-04-14
Payer: COMMERCIAL

## 2022-06-17 ENCOUNTER — OFFICE VISIT (OUTPATIENT)
Dept: URGENT CARE | Facility: URGENT CARE | Age: 37
End: 2022-06-17
Payer: COMMERCIAL

## 2022-06-17 VITALS
HEART RATE: 90 BPM | OXYGEN SATURATION: 100 % | BODY MASS INDEX: 28.12 KG/M2 | TEMPERATURE: 98.5 F | WEIGHT: 155 LBS | SYSTOLIC BLOOD PRESSURE: 110 MMHG | DIASTOLIC BLOOD PRESSURE: 70 MMHG

## 2022-06-17 DIAGNOSIS — R07.0 THROAT PAIN: Primary | ICD-10-CM

## 2022-06-17 LAB — DEPRECATED S PYO AG THROAT QL EIA: NEGATIVE

## 2022-06-17 PROCEDURE — U0005 INFEC AGEN DETEC AMPLI PROBE: HCPCS | Performed by: PHYSICIAN ASSISTANT

## 2022-06-17 PROCEDURE — 99213 OFFICE O/P EST LOW 20 MIN: CPT | Mod: CS | Performed by: PHYSICIAN ASSISTANT

## 2022-06-17 PROCEDURE — U0003 INFECTIOUS AGENT DETECTION BY NUCLEIC ACID (DNA OR RNA); SEVERE ACUTE RESPIRATORY SYNDROME CORONAVIRUS 2 (SARS-COV-2) (CORONAVIRUS DISEASE [COVID-19]), AMPLIFIED PROBE TECHNIQUE, MAKING USE OF HIGH THROUGHPUT TECHNOLOGIES AS DESCRIBED BY CMS-2020-01-R: HCPCS | Performed by: PHYSICIAN ASSISTANT

## 2022-06-17 PROCEDURE — 87651 STREP A DNA AMP PROBE: CPT | Performed by: PHYSICIAN ASSISTANT

## 2022-06-18 LAB — GROUP A STREP BY PCR: NOT DETECTED

## 2022-06-18 NOTE — PROGRESS NOTES
Assessment & Plan     1. Throat pain  For the past one day. Throat is slightly erythematous on exam without evidence of PTA, RPA or deep fascial space abscess.   Suspect viral etiology  Encouraged fluids, rest and salt water gargles.   - Streptococcus A Rapid Screen w/Reflex to PCR - Clinic Collect  - Symptomatic; Unknown COVID-19 Virus (Coronavirus) by PCR Nasopharyngeal  - Group A Streptococcus PCR Throat Swab        Return in about 1 week (around 6/24/2022), or if symptoms worsen or fail to improve.    Diagnosis and treatment plan was reviewed with patient and/or family.   We went over any labs or imaging. Discussed worsening symptoms or little to no relief despite treatment plan to follow-up with PCP or return to clinic.  Patient verbalizes understanding. All questions were addressed and answered.     Dina Erwin PA-C  [facility]    CHIEF COMPLAINT:   Chief Complaint   Patient presents with     Pharyngitis     Swollen throat, swollen gland- sx began today, bilat ear pain      Subjective     Subhasini is a 37 year old female who presents to clinic today for evaluation sore throat and swollen glands. Symptoms started today. Rarely gets sick, but feels like she is coming down with something. NO fever. Has not had ill contacts.       Past Medical History:   Diagnosis Date     Anemia      Hodgkin lymphoma, nodular sclerosis (H)      Neck mass     RIGHT     Past Surgical History:   Procedure Laterality Date     BIOPSY MASS NECK Right 10/6/2017    Procedure: BIOPSY MASS NECK;  BIOPSY RIGHT NECK MASS; EXCISION OF RIGHT CERVICAL LYMPH NODES;  Surgeon: Roberto Jonas MD;  Location:  OR     BONE MARROW BIOPSY, BONE SPECIMEN, NEEDLE/TROCAR N/A 10/16/2017    Procedure: BIOPSY BONE MARROW;  UNILATERAL BONE MARROW BIOPSY (CONSCIOUS SEDATION) ;  Surgeon: Yang Martini MD;  Location:  GI     NO HISTORY OF SURGERY       Social History     Tobacco Use     Smoking status:  Never Smoker     Smokeless tobacco: Never Used   Substance Use Topics     Alcohol use: No     Alcohol/week: 0.0 standard drinks     Current Outpatient Medications   Medication     cholecalciferol (VITAMIN D3) 58482 units (1250 mcg) capsule     No current facility-administered medications for this visit.     Allergies   Allergen Reactions     Nkda [No Known Drug Allergies]        10 point ROS of systems were all negative except for pertinent positives noted in my HPI.      Exam:   /70 (BP Location: Right arm)   Pulse 90   Temp 98.5  F (36.9  C) (Tympanic)   Wt 70.3 kg (155 lb)   SpO2 100%   BMI 28.12 kg/m    Constitutional: healthy, alert and no distress  Head: Normocephalic, atraumatic.  Eyes: conjunctiva clear, no drainage  ENT: TMs clear and shiny sylvia, nasal mucosa pink and moist, throat is slightly erythematous without trismus or drooling.   Neck: neck is supple, no cervical lymphadenopathy or nuchal rigidity  Cardiovascular: RRR  Respiratory: CTA bilaterally, no rhonchi or rales  Skin: no rashes  Neurologic: Speech clear, gait normal. Moves all extremities.    Results for orders placed or performed in visit on 06/17/22   Streptococcus A Rapid Screen w/Reflex to PCR - Clinic Collect     Status: Normal    Specimen: Throat; Swab   Result Value Ref Range    Group A Strep antigen Negative Negative

## 2022-06-19 LAB — SARS-COV-2 RNA RESP QL NAA+PROBE: NEGATIVE

## 2022-11-16 ENCOUNTER — TRANSFERRED RECORDS (OUTPATIENT)
Dept: HEALTH INFORMATION MANAGEMENT | Facility: CLINIC | Age: 37
End: 2022-11-16

## 2023-04-23 ENCOUNTER — HEALTH MAINTENANCE LETTER (OUTPATIENT)
Age: 38
End: 2023-04-23

## 2023-05-31 ENCOUNTER — OFFICE VISIT (OUTPATIENT)
Dept: INTERNAL MEDICINE | Facility: CLINIC | Age: 38
End: 2023-05-31
Payer: COMMERCIAL

## 2023-05-31 VITALS
TEMPERATURE: 98.3 F | HEIGHT: 62 IN | SYSTOLIC BLOOD PRESSURE: 116 MMHG | DIASTOLIC BLOOD PRESSURE: 72 MMHG | HEART RATE: 94 BPM | OXYGEN SATURATION: 100 % | RESPIRATION RATE: 16 BRPM | WEIGHT: 159.2 LBS | BODY MASS INDEX: 29.3 KG/M2

## 2023-05-31 DIAGNOSIS — Z12.4 CERVICAL CANCER SCREENING: ICD-10-CM

## 2023-05-31 DIAGNOSIS — Z11.59 NEED FOR HEPATITIS C SCREENING TEST: ICD-10-CM

## 2023-05-31 DIAGNOSIS — Z00.00 ROUTINE GENERAL MEDICAL EXAMINATION AT A HEALTH CARE FACILITY: Primary | ICD-10-CM

## 2023-05-31 LAB
ALBUMIN SERPL BCG-MCNC: 4.5 G/DL (ref 3.5–5.2)
ALP SERPL-CCNC: 64 U/L (ref 35–104)
ALT SERPL W P-5'-P-CCNC: 22 U/L (ref 10–35)
ANION GAP SERPL CALCULATED.3IONS-SCNC: 11 MMOL/L (ref 7–15)
AST SERPL W P-5'-P-CCNC: 20 U/L (ref 10–35)
BILIRUB SERPL-MCNC: 0.3 MG/DL
BUN SERPL-MCNC: 8.3 MG/DL (ref 6–20)
CALCIUM SERPL-MCNC: 9.5 MG/DL (ref 8.6–10)
CHLORIDE SERPL-SCNC: 103 MMOL/L (ref 98–107)
CHOLEST SERPL-MCNC: 184 MG/DL
CREAT SERPL-MCNC: 0.55 MG/DL (ref 0.51–0.95)
DEPRECATED HCO3 PLAS-SCNC: 23 MMOL/L (ref 22–29)
ERYTHROCYTE [DISTWIDTH] IN BLOOD BY AUTOMATED COUNT: 12.9 % (ref 10–15)
GFR SERPL CREATININE-BSD FRML MDRD: >90 ML/MIN/1.73M2
GLUCOSE SERPL-MCNC: 92 MG/DL (ref 70–99)
HCT VFR BLD AUTO: 39.9 % (ref 35–47)
HDLC SERPL-MCNC: 45 MG/DL
HGB BLD-MCNC: 13.1 G/DL (ref 11.7–15.7)
LDLC SERPL CALC-MCNC: 123 MG/DL
MCH RBC QN AUTO: 28.7 PG (ref 26.5–33)
MCHC RBC AUTO-ENTMCNC: 32.8 G/DL (ref 31.5–36.5)
MCV RBC AUTO: 88 FL (ref 78–100)
NONHDLC SERPL-MCNC: 139 MG/DL
PLATELET # BLD AUTO: 272 10E3/UL (ref 150–450)
POTASSIUM SERPL-SCNC: 3.9 MMOL/L (ref 3.4–5.3)
PROT SERPL-MCNC: 7.6 G/DL (ref 6.4–8.3)
RBC # BLD AUTO: 4.56 10E6/UL (ref 3.8–5.2)
SODIUM SERPL-SCNC: 137 MMOL/L (ref 136–145)
TRIGL SERPL-MCNC: 78 MG/DL
WBC # BLD AUTO: 7.2 10E3/UL (ref 4–11)

## 2023-05-31 PROCEDURE — G0145 SCR C/V CYTO,THINLAYER,RESCR: HCPCS | Performed by: INTERNAL MEDICINE

## 2023-05-31 PROCEDURE — 80061 LIPID PANEL: CPT | Performed by: INTERNAL MEDICINE

## 2023-05-31 PROCEDURE — 85027 COMPLETE CBC AUTOMATED: CPT | Performed by: INTERNAL MEDICINE

## 2023-05-31 PROCEDURE — 82306 VITAMIN D 25 HYDROXY: CPT | Performed by: INTERNAL MEDICINE

## 2023-05-31 PROCEDURE — 80053 COMPREHEN METABOLIC PANEL: CPT | Performed by: INTERNAL MEDICINE

## 2023-05-31 PROCEDURE — 87624 HPV HI-RISK TYP POOLED RSLT: CPT | Performed by: INTERNAL MEDICINE

## 2023-05-31 PROCEDURE — 36415 COLL VENOUS BLD VENIPUNCTURE: CPT | Performed by: INTERNAL MEDICINE

## 2023-05-31 PROCEDURE — 86803 HEPATITIS C AB TEST: CPT | Performed by: INTERNAL MEDICINE

## 2023-05-31 PROCEDURE — 99396 PREV VISIT EST AGE 40-64: CPT | Performed by: INTERNAL MEDICINE

## 2023-05-31 ASSESSMENT — ENCOUNTER SYMPTOMS
PARESTHESIAS: 0
HEMATOCHEZIA: 0
DIARRHEA: 0
PALPITATIONS: 0
CHILLS: 0
NAUSEA: 0
ARTHRALGIAS: 0
FEVER: 0
DYSURIA: 0
HEADACHES: 0
HEMATURIA: 0
SHORTNESS OF BREATH: 0
HEARTBURN: 0
MYALGIAS: 0
JOINT SWELLING: 0
ABDOMINAL PAIN: 0
EYE PAIN: 0
BREAST MASS: 0
SORE THROAT: 0
DIZZINESS: 0
NERVOUS/ANXIOUS: 0
WEAKNESS: 0
COUGH: 0
CONSTIPATION: 0
FREQUENCY: 0

## 2023-05-31 ASSESSMENT — PAIN SCALES - GENERAL: PAINLEVEL: NO PAIN (0)

## 2023-05-31 NOTE — PROGRESS NOTES
SUBJECTIVE:   CC: Liliam is an 38 year old who presents for preventive health visit.       2023     9:44 AM   Additional Questions   Roomed by Grzegorz     Patient has been advised of split billing requirements and indicates understanding: Yes  Healthy Habits:     Getting at least 3 servings of Calcium per day:  Yes    Bi-annual eye exam:  Yes    Dental care twice a year:  Yes    Sleep apnea or symptoms of sleep apnea:  None    Diet:  Carbohydrate counting    Frequency of exercise:  2-3 days/week    Duration of exercise:  15-30 minutes    Taking medications regularly:  Yes    Medication side effects:  None    PHQ-2 Total Score: 0    Additional concerns today:  No            Today's PHQ-2 Score:       2023     9:38 AM   PHQ-2 (  [employer])   Q1: Little interest or pleasure in doing things 0   Q2: Feeling down, depressed or hopeless 0   PHQ-2 Score 0   Q1: Little interest or pleasure in doing things Not at all   Q2: Feeling down, depressed or hopeless Not at all   PHQ-2 Score 0         Social History     Tobacco Use     Smoking status: Never     Smokeless tobacco: Never   Vaping Use     Vaping status: Never Used     Passive vaping exposure: Yes   Substance Use Topics     Alcohol use: No     Alcohol/week: 0.0 standard drinks of alcohol             2023     9:38 AM   Alcohol Use   Prescreen: >3 drinks/day or >7 drinks/week? No     Reviewed orders with patient.  Reviewed health maintenance and updated orders accordingly - Yes  Lab work is in process    Breast Cancer Screenin/31/2023     9:38 AM   Breast CA Risk Assessment (FHS-7)   Do you have a family history of breast, colon, or ovarian cancer? No / Unknown         Mammogram Screening - Offered annual screening and updated Health Maintenance for mutual plan based on risk factor consideration    Pertinent mammograms are reviewed under the imaging tab.    History of abnormal Pap smear: NO - age 30-65 PAP every 5 years with negative HPV  "co-testing recommended      Latest Ref Rng & Units 1/25/2016    12:28 PM 1/25/2016    12:00 AM 4/19/2013    12:00 AM   PAP / HPV   PAP (Historical)   NIL   NIL     HPV 16 DNA NEG Negative       HPV 18 DNA NEG Negative       Other HR HPV NEG Negative         Reviewed and updated as needed this visit by clinical staff   Tobacco  Allergies  Meds              Reviewed and updated as needed this visit by Provider                     Review of Systems   Constitutional: Negative for chills and fever.   HENT: Negative for congestion, ear pain, hearing loss and sore throat.    Eyes: Negative for pain and visual disturbance.   Respiratory: Negative for cough and shortness of breath.    Cardiovascular: Negative for chest pain, palpitations and peripheral edema.   Gastrointestinal: Negative for abdominal pain, constipation, diarrhea, heartburn, hematochezia and nausea.   Breasts:  Positive for tenderness. Negative for breast mass and discharge.   Genitourinary: Negative for dysuria, frequency, genital sores, hematuria, pelvic pain, urgency, vaginal bleeding and vaginal discharge.   Musculoskeletal: Negative for arthralgias, joint swelling and myalgias.   Skin: Negative for rash.   Neurological: Negative for dizziness, weakness, headaches and paresthesias.   Psychiatric/Behavioral: Negative for mood changes. The patient is not nervous/anxious.           OBJECTIVE:   /72   Pulse 94   Temp 98.3  F (36.8  C) (Tympanic)   Resp 16   Ht 1.581 m (5' 2.25\")   Wt 72.2 kg (159 lb 3.2 oz)   LMP 04/17/2023   SpO2 100%   BMI 28.88 kg/m    Physical Exam  GENERAL: healthy, alert and no distress  EYES: Eyes grossly normal to inspection, PERRL and conjunctivae and sclerae normal  HENT: ear canals and TM's normal, nose and mouth without ulcers or lesions  RESP: lungs clear to auscultation - no rales, rhonchi or wheezes  BREAST: normal without masses, tenderness or nipple discharge and no palpable axillary masses or " adenopathy  CV: regular rate and rhythm, normal S1 S2  ABDOMEN: soft, nontender, no hepatosplenomegaly, no masses.  MS: no gross musculoskeletal defects noted, no edema  SKIN: no suspicious lesions or rashes  NEURO: Normal strength and tone, mentation intact and speech normal  PSYCH: mentation appears normal, affect normal/bright        ASSESSMENT/PLAN:       ICD-10-CM    1. Routine general medical examination at a health care facility  Z00.00 CBC with platelets     Comprehensive metabolic panel     Lipid panel reflex to direct LDL Fasting     Vitamin D Deficiency     CBC with platelets     Comprehensive metabolic panel     Lipid panel reflex to direct LDL Fasting     Vitamin D Deficiency      2. Need for hepatitis C screening test  Z11.59 Hepatitis C Screen Reflex to HCV RNA Quant and Genotype     Hepatitis C Screen Reflex to HCV RNA Quant and Genotype      3. Cervical cancer screening  Z12.4 Pap Screen with HPV - recommended age 30 - 65 years          Patient has been advised of split billing requirements and indicates understanding: Yes      COUNSELING:       Regular exercise       Healthy diet/nutrition        She reports that she has never smoked. She has never used smokeless tobacco.          Alena Sanchez MD  [facility]

## 2023-06-01 LAB
DEPRECATED CALCIDIOL+CALCIFEROL SERPL-MC: 40 UG/L (ref 20–75)
HCV AB SERPL QL IA: NONREACTIVE

## 2023-06-05 LAB
BKR LAB AP GYN ADEQUACY: NORMAL
BKR LAB AP GYN INTERPRETATION: NORMAL
BKR LAB AP HPV REFLEX: NORMAL
BKR LAB AP PREVIOUS ABNORMAL: NORMAL
PATH REPORT.COMMENTS IMP SPEC: NORMAL
PATH REPORT.COMMENTS IMP SPEC: NORMAL
PATH REPORT.RELEVANT HX SPEC: NORMAL

## 2023-06-06 LAB
HUMAN PAPILLOMA VIRUS 16 DNA: NEGATIVE
HUMAN PAPILLOMA VIRUS 18 DNA: NEGATIVE
HUMAN PAPILLOMA VIRUS FINAL DIAGNOSIS: NORMAL
HUMAN PAPILLOMA VIRUS OTHER HR: NEGATIVE

## 2023-06-07 ENCOUNTER — TRANSFERRED RECORDS (OUTPATIENT)
Dept: HEALTH INFORMATION MANAGEMENT | Facility: CLINIC | Age: 38
End: 2023-06-07
Payer: COMMERCIAL

## 2024-05-01 ENCOUNTER — PATIENT OUTREACH (OUTPATIENT)
Dept: CARE COORDINATION | Facility: CLINIC | Age: 39
End: 2024-05-01
Payer: COMMERCIAL

## 2024-05-15 ENCOUNTER — PATIENT OUTREACH (OUTPATIENT)
Dept: CARE COORDINATION | Facility: CLINIC | Age: 39
End: 2024-05-15
Payer: COMMERCIAL

## 2024-09-07 ENCOUNTER — HEALTH MAINTENANCE LETTER (OUTPATIENT)
Age: 39
End: 2024-09-07

## 2024-11-27 ENCOUNTER — PATIENT OUTREACH (OUTPATIENT)
Dept: CARE COORDINATION | Facility: CLINIC | Age: 39
End: 2024-11-27
Payer: COMMERCIAL

## 2025-04-20 ENCOUNTER — HEALTH MAINTENANCE LETTER (OUTPATIENT)
Age: 40
End: 2025-04-20

## (undated) DEVICE — SPECIMEN CONTAINER 60MLW/10% FORMALIN 59601

## (undated) DEVICE — PREP CHLORAPREP W/ORANGE TINT 10.5ML 260715

## (undated) DEVICE — GOWN IMPERVIOUS ZONED LG

## (undated) DEVICE — SU VICRYL 3-0 CT-2 27" J332H

## (undated) DEVICE — LINEN TOWEL PACK X5 5464

## (undated) DEVICE — NDL 22GA 1.5"

## (undated) DEVICE — SU VICRYL 3-0 SH 27" J316H

## (undated) DEVICE — ESU ELEC BLADE 2.75" COATED/INSULATED E1455

## (undated) DEVICE — GLOVE PROTEXIS W/NEU-THERA 7.5  2D73TE75

## (undated) DEVICE — GLOVE PROTEXIS W/NEU-THERA 6.5  2D73TE65

## (undated) DEVICE — SOL NACL 0.9% IRRIG 1000ML BOTTLE 07138-09

## (undated) DEVICE — SYR BULB IRRIG 50ML LATEX FREE 0035280

## (undated) DEVICE — DRAPE POUCH INSTRUMENT 1018

## (undated) DEVICE — TUBING SUCTION MEDI-VAC SOFT 3/16"X20' N520A

## (undated) DEVICE — SU VICRYL 4-0 PS-2 18" UND J496H

## (undated) DEVICE — DRAPE PEDS  LAP 29493

## (undated) DEVICE — SUCTION CANISTER MEDIVAC LINER 3000ML W/LID 65651-530

## (undated) DEVICE — PACK MINOR SBA15MIFSE

## (undated) DEVICE — PEN MARKING SKIN

## (undated) RX ORDER — FENTANYL CITRATE 50 UG/ML
INJECTION, SOLUTION INTRAMUSCULAR; INTRAVENOUS
Status: DISPENSED
Start: 2017-10-06

## (undated) RX ORDER — FENTANYL CITRATE 50 UG/ML
INJECTION, SOLUTION INTRAMUSCULAR; INTRAVENOUS
Status: DISPENSED
Start: 2017-10-16

## (undated) RX ORDER — CEFAZOLIN SODIUM 2 G/100ML
INJECTION, SOLUTION INTRAVENOUS
Status: DISPENSED
Start: 2018-11-01

## (undated) RX ORDER — HYDROCODONE BITARTRATE AND ACETAMINOPHEN 5; 325 MG/1; MG/1
TABLET ORAL
Status: DISPENSED
Start: 2017-10-06

## (undated) RX ORDER — LIDOCAINE HYDROCHLORIDE AND EPINEPHRINE 10; 10 MG/ML; UG/ML
INJECTION, SOLUTION INFILTRATION; PERINEURAL
Status: DISPENSED
Start: 2017-10-16

## (undated) RX ORDER — CEFAZOLIN SODIUM 2 G/100ML
INJECTION, SOLUTION INTRAVENOUS
Status: DISPENSED
Start: 2017-10-27

## (undated) RX ORDER — PROPOFOL 10 MG/ML
INJECTION, EMULSION INTRAVENOUS
Status: DISPENSED
Start: 2017-10-06

## (undated) RX ORDER — LIDOCAINE HYDROCHLORIDE 20 MG/ML
INJECTION, SOLUTION EPIDURAL; INFILTRATION; INTRACAUDAL; PERINEURAL
Status: DISPENSED
Start: 2017-10-06

## (undated) RX ORDER — BUPIVACAINE HYDROCHLORIDE 5 MG/ML
INJECTION, SOLUTION EPIDURAL; INTRACAUDAL
Status: DISPENSED
Start: 2017-10-06

## (undated) RX ORDER — LIDOCAINE HYDROCHLORIDE 10 MG/ML
INJECTION, SOLUTION INFILTRATION; PERINEURAL
Status: DISPENSED
Start: 2017-10-06

## (undated) RX ORDER — CEFAZOLIN SODIUM 2 G/100ML
INJECTION, SOLUTION INTRAVENOUS
Status: DISPENSED
Start: 2017-10-06